# Patient Record
Sex: FEMALE | Race: ASIAN | Employment: UNEMPLOYED | URBAN - METROPOLITAN AREA
[De-identification: names, ages, dates, MRNs, and addresses within clinical notes are randomized per-mention and may not be internally consistent; named-entity substitution may affect disease eponyms.]

---

## 2024-09-10 ENCOUNTER — APPOINTMENT (OUTPATIENT)
Dept: CT IMAGING | Facility: HOSPITAL | Age: 69
End: 2024-09-10
Attending: EMERGENCY MEDICINE
Payer: COMMERCIAL

## 2024-09-10 ENCOUNTER — APPOINTMENT (OUTPATIENT)
Dept: GENERAL RADIOLOGY | Facility: HOSPITAL | Age: 69
End: 2024-09-10
Attending: EMERGENCY MEDICINE
Payer: COMMERCIAL

## 2024-09-10 ENCOUNTER — HOSPITAL ENCOUNTER (INPATIENT)
Facility: HOSPITAL | Age: 69
LOS: 4 days | Discharge: HOME | End: 2024-09-14
Attending: EMERGENCY MEDICINE | Admitting: HOSPITALIST
Payer: COMMERCIAL

## 2024-09-10 ENCOUNTER — APPOINTMENT (OUTPATIENT)
Dept: INTERVENTIONAL RADIOLOGY/VASCULAR | Facility: HOSPITAL | Age: 69
End: 2024-09-10
Attending: INTERNAL MEDICINE
Payer: COMMERCIAL

## 2024-09-10 DIAGNOSIS — I21.3 ST ELEVATION MYOCARDIAL INFARCTION (STEMI), UNSPECIFIED ARTERY (HCC): Primary | ICD-10-CM

## 2024-09-10 DIAGNOSIS — I44.1 HEART BLOCK AV SECOND DEGREE: ICD-10-CM

## 2024-09-10 PROBLEM — D64.9 ANEMIA: Status: ACTIVE | Noted: 2024-09-10

## 2024-09-10 LAB
ALBUMIN SERPL-MCNC: 3.6 G/DL (ref 3.2–4.8)
ALBUMIN/GLOB SERPL: 1.2 {RATIO} (ref 1–2)
ALP LIVER SERPL-CCNC: 51 U/L
ALT SERPL-CCNC: 20 U/L
ANION GAP SERPL CALC-SCNC: 8 MMOL/L (ref 0–18)
AST SERPL-CCNC: 30 U/L (ref ?–34)
BASE EXCESS BLDV CALC-SCNC: -2 MMOL/L
BASOPHILS # BLD AUTO: 0.03 X10(3) UL (ref 0–0.2)
BASOPHILS NFR BLD AUTO: 0.3 %
BILIRUB SERPL-MCNC: 0.6 MG/DL (ref 0.2–1.1)
BUN BLD-MCNC: 9 MG/DL (ref 9–23)
CALCIUM BLD-MCNC: 9 MG/DL (ref 8.7–10.4)
CHLORIDE SERPL-SCNC: 104 MMOL/L (ref 98–112)
CHOLEST SERPL-MCNC: 156 MG/DL (ref ?–200)
CO2 SERPL-SCNC: 25 MMOL/L (ref 21–32)
CREAT BLD-MCNC: 0.84 MG/DL
EGFRCR SERPLBLD CKD-EPI 2021: 75 ML/MIN/1.73M2 (ref 60–?)
EOSINOPHIL # BLD AUTO: 0.24 X10(3) UL (ref 0–0.7)
EOSINOPHIL NFR BLD AUTO: 2.6 %
ERYTHROCYTE [DISTWIDTH] IN BLOOD BY AUTOMATED COUNT: 13.2 %
GLOBULIN PLAS-MCNC: 2.9 G/DL (ref 2–3.5)
GLUCOSE BLD-MCNC: 116 MG/DL (ref 70–99)
GLUCOSE BLD-MCNC: 248 MG/DL (ref 70–99)
HCO3 BLDV-SCNC: 23 MEQ/L (ref 22–26)
HCT VFR BLD AUTO: 36.2 %
HDLC SERPL-MCNC: 49 MG/DL (ref 40–59)
HGB BLD-MCNC: 11.2 G/DL
IMM GRANULOCYTES # BLD AUTO: 0.02 X10(3) UL (ref 0–1)
IMM GRANULOCYTES NFR BLD: 0.2 %
LDLC SERPL CALC-MCNC: 85 MG/DL (ref ?–100)
LYMPHOCYTES # BLD AUTO: 4.4 X10(3) UL (ref 1–4)
LYMPHOCYTES NFR BLD AUTO: 47.6 %
MCH RBC QN AUTO: 28.3 PG (ref 26–34)
MCHC RBC AUTO-ENTMCNC: 30.9 G/DL (ref 31–37)
MCV RBC AUTO: 91.4 FL
MONOCYTES # BLD AUTO: 0.68 X10(3) UL (ref 0.1–1)
MONOCYTES NFR BLD AUTO: 7.4 %
NEUTROPHILS # BLD AUTO: 3.88 X10 (3) UL (ref 1.5–7.7)
NEUTROPHILS # BLD AUTO: 3.88 X10(3) UL (ref 1.5–7.7)
NEUTROPHILS NFR BLD AUTO: 41.9 %
NONHDLC SERPL-MCNC: 107 MG/DL (ref ?–130)
OSMOLALITY SERPL CALC.SUM OF ELEC: 291 MOSM/KG (ref 275–295)
OXYHGB MFR BLDV: 82.8 % (ref 72–78)
PCO2 BLDV: 43 MM HG (ref 38–50)
PH BLDV: 7.35 [PH] (ref 7.33–7.43)
PLATELET # BLD AUTO: 265 10(3)UL (ref 150–450)
PO2 BLDV: 52 MM HG (ref 30–50)
POTASSIUM SERPL-SCNC: 3.8 MMOL/L (ref 3.5–5.1)
PROT SERPL-MCNC: 6.5 G/DL (ref 5.7–8.2)
RBC # BLD AUTO: 3.96 X10(6)UL
SODIUM SERPL-SCNC: 137 MMOL/L (ref 136–145)
T4 FREE SERPL-MCNC: 1.2 NG/DL (ref 0.8–1.7)
TRIGL SERPL-MCNC: 124 MG/DL (ref 30–149)
TROPONIN I SERPL HS-MCNC: 135 NG/L
TSI SER-ACNC: 7.22 MIU/ML (ref 0.55–4.78)
VIT D+METAB SERPL-MCNC: 22.3 NG/ML (ref 30–100)
VLDLC SERPL CALC-MCNC: 20 MG/DL (ref 0–30)
WBC # BLD AUTO: 9.3 X10(3) UL (ref 4–11)

## 2024-09-10 PROCEDURE — 99223 1ST HOSP IP/OBS HIGH 75: CPT | Performed by: HOSPITALIST

## 2024-09-10 PROCEDURE — 027034Z DILATION OF CORONARY ARTERY, ONE ARTERY WITH DRUG-ELUTING INTRALUMINAL DEVICE, PERCUTANEOUS APPROACH: ICD-10-PCS | Performed by: INTERNAL MEDICINE

## 2024-09-10 PROCEDURE — 71045 X-RAY EXAM CHEST 1 VIEW: CPT | Performed by: EMERGENCY MEDICINE

## 2024-09-10 PROCEDURE — B240ZZ3 ULTRASONOGRAPHY OF SINGLE CORONARY ARTERY, INTRAVASCULAR: ICD-10-PCS | Performed by: INTERNAL MEDICINE

## 2024-09-10 PROCEDURE — B41FYZZ FLUOROSCOPY OF RIGHT LOWER EXTREMITY ARTERIES USING OTHER CONTRAST: ICD-10-PCS | Performed by: INTERNAL MEDICINE

## 2024-09-10 PROCEDURE — 71275 CT ANGIOGRAPHY CHEST: CPT | Performed by: EMERGENCY MEDICINE

## 2024-09-10 PROCEDURE — 70450 CT HEAD/BRAIN W/O DYE: CPT | Performed by: EMERGENCY MEDICINE

## 2024-09-10 PROCEDURE — 74177 CT ABD & PELVIS W/CONTRAST: CPT | Performed by: EMERGENCY MEDICINE

## 2024-09-10 PROCEDURE — B211YZZ FLUOROSCOPY OF MULTIPLE CORONARY ARTERIES USING OTHER CONTRAST: ICD-10-PCS | Performed by: INTERNAL MEDICINE

## 2024-09-10 PROCEDURE — 72125 CT NECK SPINE W/O DYE: CPT | Performed by: EMERGENCY MEDICINE

## 2024-09-10 PROCEDURE — 4A023N7 MEASUREMENT OF CARDIAC SAMPLING AND PRESSURE, LEFT HEART, PERCUTANEOUS APPROACH: ICD-10-PCS | Performed by: INTERNAL MEDICINE

## 2024-09-10 RX ORDER — IODIXANOL 320 MG/ML
100 INJECTION, SOLUTION INTRAVASCULAR
Status: DISCONTINUED | OUTPATIENT
Start: 2024-09-10 | End: 2024-09-10

## 2024-09-10 RX ORDER — IODIXANOL 320 MG/ML
100 INJECTION, SOLUTION INTRAVASCULAR
Status: COMPLETED | OUTPATIENT
Start: 2024-09-10 | End: 2024-09-10

## 2024-09-10 RX ORDER — ASPIRIN 81 MG/1
81 TABLET ORAL DAILY
Status: DISCONTINUED | OUTPATIENT
Start: 2024-09-11 | End: 2024-09-10

## 2024-09-10 RX ORDER — ACETAMINOPHEN 500 MG
500 TABLET ORAL EVERY 4 HOURS PRN
Status: DISCONTINUED | OUTPATIENT
Start: 2024-09-10 | End: 2024-09-14

## 2024-09-10 RX ORDER — ATENOLOL 50 MG/1
50 TABLET ORAL DAILY
COMMUNITY
End: 2024-09-14

## 2024-09-10 RX ORDER — HEPARIN SODIUM 5000 [USP'U]/ML
INJECTION, SOLUTION INTRAVENOUS; SUBCUTANEOUS
Status: COMPLETED
Start: 2024-09-10 | End: 2024-09-10

## 2024-09-10 RX ORDER — ASPIRIN 81 MG/1
324 TABLET, CHEWABLE ORAL ONCE
Status: COMPLETED | OUTPATIENT
Start: 2024-09-10 | End: 2024-09-10

## 2024-09-10 RX ORDER — ATORVASTATIN CALCIUM 80 MG/1
80 TABLET, FILM COATED ORAL NIGHTLY
Status: DISCONTINUED | OUTPATIENT
Start: 2024-09-10 | End: 2024-09-14

## 2024-09-10 RX ORDER — DOPAMINE HYDROCHLORIDE 320 MG/100ML
INJECTION, SOLUTION INTRAVENOUS
Status: COMPLETED
Start: 2024-09-10 | End: 2024-09-10

## 2024-09-10 RX ORDER — ASPIRIN 81 MG/1
81 TABLET, CHEWABLE ORAL DAILY
Status: DISCONTINUED | OUTPATIENT
Start: 2024-09-11 | End: 2024-09-14

## 2024-09-10 RX ORDER — POLYETHYLENE GLYCOL 3350 17 G/17G
17 POWDER, FOR SOLUTION ORAL DAILY PRN
Status: DISCONTINUED | OUTPATIENT
Start: 2024-09-10 | End: 2024-09-14

## 2024-09-10 RX ORDER — BISACODYL 10 MG
10 SUPPOSITORY, RECTAL RECTAL
Status: DISCONTINUED | OUTPATIENT
Start: 2024-09-10 | End: 2024-09-14

## 2024-09-10 RX ORDER — DEXTROSE MONOHYDRATE 25 G/50ML
50 INJECTION, SOLUTION INTRAVENOUS
Status: DISCONTINUED | OUTPATIENT
Start: 2024-09-10 | End: 2024-09-14

## 2024-09-10 RX ORDER — NOREPINEPHRINE BITARTRATE 1 MG/ML
INJECTION, SOLUTION INTRAVENOUS
Status: COMPLETED
Start: 2024-09-10 | End: 2024-09-10

## 2024-09-10 RX ORDER — AMLODIPINE BESYLATE 5 MG/1
5 TABLET ORAL DAILY
COMMUNITY
End: 2024-09-14

## 2024-09-10 RX ORDER — SODIUM CHLORIDE 9 MG/ML
INJECTION, SOLUTION INTRAVENOUS CONTINUOUS
Status: ACTIVE | OUTPATIENT
Start: 2024-09-10 | End: 2024-09-10

## 2024-09-10 RX ORDER — LIDOCAINE HYDROCHLORIDE 10 MG/ML
INJECTION, SOLUTION EPIDURAL; INFILTRATION; INTRACAUDAL; PERINEURAL
Status: COMPLETED
Start: 2024-09-10 | End: 2024-09-10

## 2024-09-10 RX ORDER — NICOTINE POLACRILEX 4 MG
15 LOZENGE BUCCAL
Status: DISCONTINUED | OUTPATIENT
Start: 2024-09-10 | End: 2024-09-14

## 2024-09-10 RX ORDER — HEPARIN SODIUM 5000 [USP'U]/ML
5000 INJECTION, SOLUTION INTRAVENOUS; SUBCUTANEOUS EVERY 12 HOURS SCHEDULED
Status: DISCONTINUED | OUTPATIENT
Start: 2024-09-10 | End: 2024-09-14

## 2024-09-10 RX ORDER — NICOTINE POLACRILEX 4 MG
30 LOZENGE BUCCAL
Status: DISCONTINUED | OUTPATIENT
Start: 2024-09-10 | End: 2024-09-14

## 2024-09-10 RX ORDER — MELATONIN
3 NIGHTLY PRN
Status: DISCONTINUED | OUTPATIENT
Start: 2024-09-10 | End: 2024-09-14

## 2024-09-10 RX ORDER — METOCLOPRAMIDE HYDROCHLORIDE 5 MG/ML
10 INJECTION INTRAMUSCULAR; INTRAVENOUS EVERY 8 HOURS PRN
Status: DISCONTINUED | OUTPATIENT
Start: 2024-09-10 | End: 2024-09-14

## 2024-09-10 RX ORDER — ONDANSETRON 2 MG/ML
4 INJECTION INTRAMUSCULAR; INTRAVENOUS EVERY 6 HOURS PRN
Status: DISCONTINUED | OUTPATIENT
Start: 2024-09-10 | End: 2024-09-14

## 2024-09-10 RX ORDER — SENNOSIDES 8.6 MG
17.2 TABLET ORAL NIGHTLY PRN
Status: DISCONTINUED | OUTPATIENT
Start: 2024-09-10 | End: 2024-09-14

## 2024-09-10 RX ORDER — MIDAZOLAM HYDROCHLORIDE 1 MG/ML
INJECTION INTRAMUSCULAR; INTRAVENOUS
Status: COMPLETED
Start: 2024-09-10 | End: 2024-09-10

## 2024-09-10 RX ORDER — SODIUM PHOSPHATE, DIBASIC AND SODIUM PHOSPHATE, MONOBASIC 7; 19 G/230ML; G/230ML
1 ENEMA RECTAL ONCE AS NEEDED
Status: DISCONTINUED | OUTPATIENT
Start: 2024-09-10 | End: 2024-09-14

## 2024-09-10 NOTE — CONSULTS
[facility] - CARDIOLOGY CONSULT NOTE    Samantha Beasley Patient Status:  Emergency    1/3/1955 MRN [id number]   Location [facility] EMERGENCY DEPARTMENT Attending Ayan Griggs MD   Hosp Day # 0 PCP No primary care provider on file.     Date of Admission:  9/10/2024  Date of Consult:  9/10/2024  I was asked by Ayan Griggs MD to provide recommendations for evaluation and management of cardiac issues.    Reason for Consultation:     STEMI    History of Present Illness:   Patient is a 69 year old female with HTN who was found down with seizure like activity. Unclear if she hit her head. EMS was called. Concern for STEMI on EKG. She is A and O x1 which is not her normal self. When asked if she has chest pain or SOB she denies. Her EKG shows JENNIFER in the inferior leads with bradycardia, repeat shows improved ST changes. She was sent for CT head/neck/chest to r/o intracranial hemorrhage and dissection. Troponin is pending. ASA loaded.       Results:     Lab Results   Component Value Date    WBC 9.3 09/10/2024    HGB 11.2 (L) 09/10/2024    HCT 36.2 09/10/2024    .0 09/10/2024       No results found.  EKG 12 Lead    Result Date: 9/10/2024  Sinus rhythm with 1st degree A-V block Nonspecific ST abnormality Abnormal ECG When compared with ECG of 10-SEP-2024 16:16, Sinus rhythm has replaced Junctional rhythm ST less elevated in Inferior leads ST no longer depressed in Lateral leads     Past Medical History  No past medical history on file.    Past Surgical History  No past surgical history on file.    Family History  No family history on file.    Social History  Pediatric History   Patient Parents    Not on file     Other Topics Concern    Not on file   Social History Narrative    Not on file           Current Medications:  No current facility-administered medications for this encounter.     (Not in a hospital admission)      Allergies  No Known Allergies    Review of Systems:   10 pt ROS performed,  separate from HPI  Review of Systems:  GENERAL: no fevers, chills, sweats  HEENT: no visual or hearing changes  SKIN: denies any unusual skin lesions or rashes  RESPIRATORY: denies shortness of breath with exertion  CARDIOVASCULAR: no active chest pain, no claudication  GI: denies abdominal pain and denies heartburn  : no dysuria or hematuria  NEURO: denies headaches, focal weaknesses or paresthesias  All other systems were reviewed are negative  Physical Exam:   Blood pressure 110/85, pulse (!) 44, resp. rate 16, weight 160 lb (72.6 kg), SpO2 100%.    Scheduled Meds:       Physical Exam:    General: No acute distress: A and O x1   HEENT: Normocephalic, anicteric sclera, neck supple  Neck: No JVD, carotids 2+, supple  Cardiac: Regular rate. No pathologic murmur.  Lungs: Clear with normal effort.  Normal excursions and effort.  Abdomen: Soft, non-tender. BS-present.  Extremities: Without clubbing, cyanosis.  Peripheral pulsespresent.  Neurologic: Alert and oriented, normal affect. Motor ok.  Skin: Warm and dry.     Imaging: I independently visualized all relevant chest imaging in PACS, agree with radiology interpretation except where noted.  Thank you for allowing me to participate in the care of your patient.    Labs:  HEM:  Recent Labs   Lab 09/10/24  1619   WBC 9.3   HGB 11.2*   .0       Chem:  No results for input(s): \"NA\", \"K\", \"CL\", \"CO2\", \"BUN\", \"CREATSERUM\", \"CA\", \"CAION\", \"MG\", \"PHOS\", \"GLU\" in the last 168 hours.    No results for input(s): \"ALT\", \"AST\", \"ALB\", \"AMYLASE\", \"LIPASE\", \"LDH\" in the last 168 hours.    Invalid input(s): \"ALPHOS\", \"TBIL\", \"DBIL\", \"TPROT\"    No results for input(s): \"TROP\", \"CK\" in the last 168 hours.    No results for input(s): \"PTP\", \"INR\" in the last 168 hours.    Impression:   # Concern for STEMI, repeat EKG with resolution of STEMI criteria. Reassuring that patient is chest pain free. Needs AMS eval prior to taking to cath lab  # HTN    Recommendations:  Check CT head  give acute mental status changes, possible head trama   Check CT chest r/o dissection   Await Trop, repeat EKG  If above is normal, will take to cath lab to r/o inferior ischemia     C5    Marcial Mercado MD  [facility]  9/10/2024

## 2024-09-10 NOTE — PROCEDURES
[facility]   part of [facility]    Cardiac Catheterization Note    Primary Proceduralist: Marcial Mercado MD  Procedure Performed: LHC, COR, and IVUS guided PCI of RCA  Date of Procedure: 9/10/2024   Indication: STEMI  Pre Operative Diagnosis: STEMI  Post Operative Diagnosis: Same  Estimated blood loss: <10cc  Specimens: None    Consent:   Informed written consent was obtained from the patient after risk benefits and alternative explained the patient.  Patient agreed to proceed    Sedation  IV conscious sedation was achieved with Versed and fentanyl.  It was administered under my direct supervision.  Hemodynamic monitoring took place throughout the entire procedure by myself in the Cath Lab staff.  1mg of Versed and 25mcg of Fentanyl were given.  Start Time: 17:17 End Time: 17:54      Description of the procedure: After written informed consent was obtained from the patient, patient was brought to the cardiac catheterization laboratory in a stable condition and fasting state.  Patient was prepped and draped in the usual sterile fashion.  IV conscious sedation was achieved with Versed and fentanyl.  Lidocaine 1% was used to infiltrate the right femoral artery site for local anesthesia and a 6 Ethiopian introducer sheath was inserted into the RFA under US guidance.  JL 4 catheter was advanced over a wire, selective coronary angiogram was performed of the left system.  JR4 guide was advanced over a wire, selective coronary angiogram was performed of the RCA.  There was a 100% thrombotic occlusion of the proximal RCA, IVUS guided PCI was performed (see results below), with good angiographic result.  At the beginning of the procedure patient became hypotensive, and bradycardiac.  She received adenosine, was started on a dopamine drip, as well as norepinephrine drip to stabilize her hemodynamics.  After the right coronary artery was opened, her hemodynamics stabilized and she was weaned off the  drips.  Pigtail was used to perform LVEDP measurement.      Coronary Angiogram Findings:  LM: Large caliber artery giving rise to LAD & LCX. No significant disease  LAD: Large caliber artery giving rise to diagonal and septal branches. Severe eccentric 80% proximal lesion, 50% mid vessel lesion, 90% distal apical lesion.  D1: small vessel mild diffuse disease   Ramus: large vessel, tortuous, luminal irregularities   LCX: Medium to large caliber artery giving rise to OM branches.   OM1: 50% proximal stenosis, moderate sized vessel  RCA: 100% proximal occlusion    Hemodynamics:  LVEDP 19 mmHg  No significant gradient upon Ao-LV pullback    Intervention:  Proximal RCA has thrombotic 100% occlusion.   Guide: JR4  Wire: Runthrough  Pre-dilation: 2.5 x 15 mm  IVUS was used to size the vessel, it was a 3.0 mm distal vessel.   Stent: 3.0 x 28 mm Synergy stent   Post dilation: 3.0 x 15 mm distally, and 3.5 x 15 proximally   IVUS was done post stent deployment- good stent apposition and edge dissection   100>0, JYOTI 0 to JYOTI 3 flow    Monitored sedation administered by the cath lab RN, and supervised throughout the procedure by myself. Total time 47 minutes.     Closure: Femoral angiogram performed. Perclose was deployed    No immediate complications.    A/P:  Inferior STEMI s/p GREGG to the proximal RCA  Severe proximal LAD lesion, will bring back for PCI in a few days prior pending clinical course   ASA and Brillinta   High intensity statin  Monitor in CCU    Marcial Mercado MD  Interventional Cardiology  [facility] – part of the [facility]

## 2024-09-10 NOTE — ED QUICK NOTES
Cath lab RN's at bedside as well as Cards MD.  Verbal read of pt's CT scans relayed to Dr. Griggs.  PT transferred into the care of cath lab and was transported up.

## 2024-09-10 NOTE — H&P
[facility]     Samantha Beasley Patient Status:  Emergency    1/3/1955 MRN [id number]   Location [facility] INTERVENTIONAL SUITES Attending No att. providers found   Hosp Day # 0 PCP No primary care provider on file.     Chief Complaint: \"Seizure-like activity\"    Subjective:    History of Present Illness:     Samantha Beasley is a 69 year old female with PMHx Htn presents via EMS for concerns of seizure-like activity.  On arrival, pt had concerns for stemi on EKG. Pt reportedly lucid but is not her self right now. In ER, pt was evaluaed by cards who initially wanted to rule out dissection / ct brain to r/o intracranial process; crisostomo negative. Pt taken to cath lab emergently. Pt seen s/p C w/ GREGG. At the bedside in the CNICU I spoke to patient's grandson. 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 There was no seizure like activity reported at this time. Pt is currently lucid and at her baseline mentation per grandson at the bedside.         History/Other:    Past Medical History:  No past medical history on file.  Past Surgical History:   No past surgical history on file.   Family History:   No family history on file.  Social History:         Allergies: No Known Allergies    Medications:    No current facility-administered medications on file prior to encounter.     No current outpatient medications on file prior to encounter.       Review of Systems:   A comprehensive review of systems was completed.    Pertinent positives and negatives noted in the HPI.    Objective:   Physical Exam:    BP (!) 134/92   Pulse 63   Resp 20   Wt 160 lb (72.6 kg)   SpO2 100%   General: No acute distress, Alert  Respiratory: No rhonchi, no wheezes  Cardiovascular: S1, S2. Regular rate and rhythm  Abdomen: Soft, Non-tender, non-distended, positive bowel sounds  Neuro: No new focal deficits  Extremities: No  edema      Results:    Labs:      Labs Last 24 Hours:    Recent Labs   Lab 09/10/24  1619   RBC 3.96   HGB 11.2*   HCT 36.2   MCV 91.4   MCH 28.3   MCHC 30.9*   RDW 13.2   NEPRELIM 3.88   WBC 9.3   .0       Recent Labs   Lab 09/10/24  1619   *   BUN 9   CREATSERUM 0.84   EGFRCR 75   CA 9.0   ALB 3.6      K 3.8      CO2 25.0   ALKPHO 51*   AST 30   ALT 20   BILT 0.6   TP 6.5       No results found for: \"PT\", \"INR\"    Recent Labs   Lab 09/10/24  1619   TROPHS 135*       No results for input(s): \"TROP\", \"PBNP\" in the last 168 hours.    No results for input(s): \"PCT\" in the last 168 hours.    Imaging: Imaging data reviewed in Epic.    Assessment & Plan:      #STEMI  #Bradycardia  -Cardiology consulted from the emergency room  -CTA chest/abdomen/pelvis ordered to rule out dissection, negative study  -s/p LHC on 9/10 (GREGG to proximal RCA), severe proximal LAD to be addressed in future  -A1c, lipid panel  -DAPT, bb, statin per cards  -echo    #Essential HTN  -unsure what meds pt on but she says \"I have bp issues\"    #Syncope  -2/2 above  -CT cervical spine, CT brain (9/10): Negative for acute process  -metabolic crisostomo    #Hyperglycemia  -no previous records, check A1c  -ISS for now    #Normocytic anemia     #Incidental aneurysmal dilatation of ascending thoracic aorta to diameter 4.4 cm  -cards following        Plan of care discussed with ER physician, patient, patient's grandson    Kirillfantasma DO Omar    Supplementary Documentation:     The 21st Century Cures Act makes medical notes like these available to patients in the interest of transparency. Please be advised this is a medical document. Medical documents are intended to carry relevant information, facts as evident, and the clinical opinion of the practitioner. The medical note is intended as peer to peer communication and may appear blunt or direct. It is written in medical language and may contain abbreviations or verbiage that are unfamiliar.

## 2024-09-11 ENCOUNTER — APPOINTMENT (OUTPATIENT)
Dept: CV DIAGNOSTICS | Facility: HOSPITAL | Age: 69
End: 2024-09-11
Attending: INTERNAL MEDICINE
Payer: COMMERCIAL

## 2024-09-11 LAB
ALBUMIN SERPL-MCNC: 3.7 G/DL (ref 3.2–4.8)
ALBUMIN/GLOB SERPL: 1.1 {RATIO} (ref 1–2)
ALP LIVER SERPL-CCNC: 57 U/L
ALT SERPL-CCNC: 19 U/L
AMMONIA PLAS-MCNC: 24 UMOL/L (ref 11–32)
ANION GAP SERPL CALC-SCNC: 8 MMOL/L (ref 0–18)
AST SERPL-CCNC: 33 U/L (ref ?–34)
ATRIAL RATE: 62 BPM
ATRIAL RATE: 67 BPM
ATRIAL RATE: 75 BPM
BASOPHILS # BLD AUTO: 0.01 X10(3) UL (ref 0–0.2)
BASOPHILS NFR BLD AUTO: 0.1 %
BILIRUB SERPL-MCNC: 1.4 MG/DL (ref 0.2–1.1)
BUN BLD-MCNC: 7 MG/DL (ref 9–23)
CALCIUM BLD-MCNC: 9.6 MG/DL (ref 8.7–10.4)
CHLORIDE SERPL-SCNC: 106 MMOL/L (ref 98–112)
CO2 SERPL-SCNC: 25 MMOL/L (ref 21–32)
CREAT BLD-MCNC: 0.65 MG/DL
EGFRCR SERPLBLD CKD-EPI 2021: 95 ML/MIN/1.73M2 (ref 60–?)
EOSINOPHIL # BLD AUTO: 0.03 X10(3) UL (ref 0–0.7)
EOSINOPHIL NFR BLD AUTO: 0.3 %
ERYTHROCYTE [DISTWIDTH] IN BLOOD BY AUTOMATED COUNT: 13.2 %
EST. AVERAGE GLUCOSE BLD GHB EST-MCNC: 131 MG/DL (ref 68–126)
GLOBULIN PLAS-MCNC: 3.3 G/DL (ref 2–3.5)
GLUCOSE BLD-MCNC: 106 MG/DL (ref 70–99)
GLUCOSE BLD-MCNC: 98 MG/DL (ref 70–99)
HBA1C MFR BLD: 6.2 % (ref ?–5.7)
HCT VFR BLD AUTO: 36.1 %
HGB BLD-MCNC: 11.8 G/DL
IMM GRANULOCYTES # BLD AUTO: 0.03 X10(3) UL (ref 0–1)
IMM GRANULOCYTES NFR BLD: 0.3 %
LYMPHOCYTES # BLD AUTO: 1.19 X10(3) UL (ref 1–4)
LYMPHOCYTES NFR BLD AUTO: 13.4 %
MAGNESIUM SERPL-MCNC: 2.1 MG/DL (ref 1.6–2.6)
MCH RBC QN AUTO: 28.9 PG (ref 26–34)
MCHC RBC AUTO-ENTMCNC: 32.7 G/DL (ref 31–37)
MCV RBC AUTO: 88.3 FL
MONOCYTES # BLD AUTO: 0.71 X10(3) UL (ref 0.1–1)
MONOCYTES NFR BLD AUTO: 8 %
MRSA DNA SPEC QL NAA+PROBE: NEGATIVE
NEUTROPHILS # BLD AUTO: 6.88 X10 (3) UL (ref 1.5–7.7)
NEUTROPHILS # BLD AUTO: 6.88 X10(3) UL (ref 1.5–7.7)
NEUTROPHILS NFR BLD AUTO: 77.9 %
OSMOLALITY SERPL CALC.SUM OF ELEC: 286 MOSM/KG (ref 275–295)
P AXIS: -1 DEGREES
P AXIS: 28 DEGREES
P AXIS: 43 DEGREES
P-R INTERVAL: 182 MS
P-R INTERVAL: 266 MS
P-R INTERVAL: 280 MS
PLATELET # BLD AUTO: 255 10(3)UL (ref 150–450)
POTASSIUM SERPL-SCNC: 3.7 MMOL/L (ref 3.5–5.1)
PROT SERPL-MCNC: 7 G/DL (ref 5.7–8.2)
Q-T INTERVAL: 402 MS
Q-T INTERVAL: 406 MS
Q-T INTERVAL: 450 MS
Q-T INTERVAL: 492 MS
QRS DURATION: 84 MS
QRS DURATION: 86 MS
QRS DURATION: 86 MS
QRS DURATION: 92 MS
QTC CALCULATION (BEZET): 429 MS
QTC CALCULATION (BEZET): 448 MS
QTC CALCULATION (BEZET): 456 MS
QTC CALCULATION (BEZET): 474 MS
R AXIS: -13 DEGREES
R AXIS: 21 DEGREES
R AXIS: 23 DEGREES
R AXIS: 7 DEGREES
RBC # BLD AUTO: 4.09 X10(6)UL
SODIUM SERPL-SCNC: 139 MMOL/L (ref 136–145)
T AXIS: -31 DEGREES
T AXIS: 66 DEGREES
T AXIS: 87 DEGREES
T AXIS: 91 DEGREES
VENTRICULAR RATE: 56 BPM
VENTRICULAR RATE: 62 BPM
VENTRICULAR RATE: 67 BPM
VENTRICULAR RATE: 75 BPM
VIT B12 SERPL-MCNC: 221 PG/ML (ref 211–911)
WBC # BLD AUTO: 8.9 X10(3) UL (ref 4–11)

## 2024-09-11 PROCEDURE — 93306 TTE W/DOPPLER COMPLETE: CPT | Performed by: INTERNAL MEDICINE

## 2024-09-11 PROCEDURE — 99232 SBSQ HOSP IP/OBS MODERATE 35: CPT | Performed by: STUDENT IN AN ORGANIZED HEALTH CARE EDUCATION/TRAINING PROGRAM

## 2024-09-11 RX ORDER — ASPIRIN 81 MG/1
TABLET, CHEWABLE ORAL ONCE
Status: CANCELLED | OUTPATIENT
Start: 2024-09-11 | End: 2024-09-11

## 2024-09-11 RX ORDER — POTASSIUM CHLORIDE 1.5 G/1.58G
40 POWDER, FOR SOLUTION ORAL ONCE
Status: COMPLETED | OUTPATIENT
Start: 2024-09-11 | End: 2024-09-11

## 2024-09-11 NOTE — ED PROVIDER NOTES
Patient Seen in: [facility]      History     Chief Complaint   Patient presents with    Chest Pain     Seizure like activity followed by confusion and found to be a STEMI     Stated Complaint: Seizure like activity followed by confusion and found to be a STEMI    Subjective:   HPI    69-year-old female here for possible STEMI.  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  Upon arrival with the paramedics the patient was somewhat confused twelve-lead EKG showed a STEMI no the patient is a poor historian and does not speak English.  A tech in the ER speaks her language as well as a family member of the her employer showed up and both him were try to obtain some further history is no history of seizures or history of cardiac problems and the patient was somewhat confused as to where she is at and poor historian.    Objective:   Past Medical History:    High blood pressure              Past Surgical History:   Procedure Laterality Date    Cataract      Fracture surgery      Hysterectomy                  Social History     Socioeconomic History    Marital status: Unknown   Tobacco Use    Smoking status: Never    Smokeless tobacco: Never   Vaping Use    Vaping status: Never Used   Substance and Sexual Activity    Alcohol use: Never    Drug use: Never     Social Determinants of Health     Food Insecurity: No Food Insecurity (9/10/2024)    Food Insecurity     Food Insecurity: Never true   Transportation Needs: No Transportation Needs (9/10/2024)    Transportation Needs     Lack of Transportation: No   Housing Stability: Low Risk  (9/10/2024)    Housing Stability     Housing Instability: No              Review of Systems    Positive for stated Chief Complaint: Chest Pain (Seizure like activity followed by confusion and found to be a STEMI)    Other systems are as noted in HPI.  Constitutional and vital signs reviewed.      All other  systems reviewed and negative except as noted above.    Physical Exam     ED Triage Vitals   BP 09/10/24 1619 110/85   Pulse 09/10/24 1619 (!) 44   Resp 09/10/24 1619 16   Temp 09/10/24 1825 (!) 96.3 °F (35.7 °C)   Temp src 09/10/24 1619 Oral   SpO2 09/10/24 1619 100 %   O2 Device 09/10/24 1619 None (Room air)       Current Vitals:   Vital Signs  BP: 138/88  Pulse: 65  Resp: 19  Temp: 97.7 °F (36.5 °C)  Temp src: Temporal  MAP (mmHg): (!) 103    Oxygen Therapy  SpO2: 96 %  O2 Device: None (Room air)  O2 Flow Rate (L/min): 3 L/min  Pulse Oximetry Type: Continuous  Oximetry Probe Site Changed: No  Pulse Ox Probe Location: Left hand            Physical Exam    Patient is alert she is orient x 2 in no acute distress the heart rate was in the 40s blood pressure with the medics was 90 in the emergency department it was like 115 systolic.  Head is atraumatic the pupils are equal round react to light extract muscles are intact the neck there is no JVD or lymphadenopathy lungs are clear cardiovascular exam shows regular rate and rhythm without murmurs abdomen is soft and nontender skin is no rash neurologic exam there is mild confusion but no focal deficits.    ED Course     Labs Reviewed   COMP METABOLIC PANEL (14) - Abnormal; Notable for the following components:       Result Value    Glucose 248 (*)     Alkaline Phosphatase 51 (*)     All other components within normal limits   CBC WITH DIFFERENTIAL WITH PLATELET - Abnormal; Notable for the following components:    HGB 11.2 (*)     MCHC 30.9 (*)     Lymphocyte Absolute 4.40 (*)     All other components within normal limits   TROPONIN I HIGH SENSITIVITY - Abnormal; Notable for the following components:    Troponin I (High Sensitivity) 135 (*)     All other components within normal limits   VENOUS BLOOD GAS - Abnormal; Notable for the following components:    Venous pO2 52 (*)     Venous O2Hb 82.8 (*)     All other components within normal limits   POCT GLUCOSE - Abnormal;  Notable for the following components:    POC Glucose 116 (*)     All other components within normal limits   LIPID PANEL - Normal   RAINBOW DRAW BLUE   RAINBOW DRAW GOLD   ED/MRSA SCREEN BY PCR-CC     EKG    Rate, intervals and axes as noted on EKG Report.  Rate: 67  Rhythm: Sinus Rhythm  Reading: Acute MI ST elevation in the inferior and posterior leads.  This is an abnormal EKG         NoneEKG    Rate, intervals and axes as noted on EKG Report.  Rate: 75  Rhythm: Sinus Rhythm  Reading: Nonspecific ST abnormality this is an abnormal EKG    EKG #3 ventricular rate 56 the rate axis interval reviewed there is sinus bradycardia with A-V dissociation and ST elevations in the inferior leads acute STEMI this is an abnormal EKG     Abnormal Labs Reviewed   COMP METABOLIC PANEL (14) - Abnormal; Notable for the following components:       Result Value    Glucose 248 (*)     Alkaline Phosphatase 51 (*)     All other components within normal limits   CBC WITH DIFFERENTIAL WITH PLATELET - Abnormal; Notable for the following components:    HGB 11.2 (*)     MCHC 30.9 (*)     Lymphocyte Absolute 4.40 (*)     All other components within normal limits   TROPONIN I HIGH SENSITIVITY - Abnormal; Notable for the following components:    Troponin I (High Sensitivity) 135 (*)     All other components within normal limits   VENOUS BLOOD GAS - Abnormal; Notable for the following components:    Venous pO2 52 (*)     Venous O2Hb 82.8 (*)     All other components within normal limits   POCT GLUCOSE - Abnormal; Notable for the following components:    POC Glucose 116 (*)     All other components within normal limits     Troponin 135     XR CHEST AP PORTABLE  (CPT=71045)    Result Date: 9/10/2024  CONCLUSION:  No acute radiographic findings.   LOCATION:  [facility]      Dictated by (CST): Ramos Sandoval MD on 9/10/2024 at 9:27 PM     Finalized by (CST): Ramos Sandoval MD on 9/10/2024 at 9:28 PM       CT SPINE CERVICAL (CPT=72125)    Result Date:  9/10/2024  CONCLUSION:  Negative for fracture or subluxation.    LOCATION:  [facility]   Dictated by (CST): Martin Quiroz MD on 9/10/2024 at 5:37 PM     Finalized by (CST): Martin Quiroz MD on 9/10/2024 at 5:40 PM       CTA CHEST + CT ABD (W) + CT PEL (W) (CPT=71275/97149)    Result Date: 9/10/2024  CONCLUSION:  1. Negative for pulmonary embolism. 2. No acute thoracic abnormality. 3. Aneurysmal dilatation of ascending thoracic aorta to a diameter 4.4 cm. 4. No acute abdominal-pelvic abnormality. 5. Details as above.  Continued clinical correlation recommended.  Findings immediately discussed with Dr. Griggs from the ED by telephone.    LOCATION:  [facility]   Dictated by (CST): Martin Quiroz MD on 9/10/2024 at 5:10 PM     Finalized by (CST): Martin Quiroz MD on 9/10/2024 at 5:16 PM       CT BRAIN OR HEAD (CPT=70450)    Result Date: 9/10/2024  CONCLUSION:  No acute intracranial abnormality.  Findings immediately discussed with Dr. Griggs from the ED by telephone.    LOCATION:  [facility]   Dictated by (CST): Martin Quiroz MD on 9/10/2024 at 5:05 PM     Finalized by (CST): Martin Quiroz MD on 9/10/2024 at 5:08 PM      Images independent reviewed there is no pulmonary embolus there is aneurysmal dilatation to 4.4 cm with no dissection.  CT of the brain was unremarkable         MDM      Initial differential diagnosis considered but not limited to includes intracranial hemorrhage seizure stroke STEMI pulmonary embolus dissection      Patient has STEMI was taken to cardiac Cath Lab she had what sounds like seizure activity this could have been related to cardiac arrhythmia unclear etiology there is no abnormality or evidence of trauma on the CT scan.  In the emergency department patient heart rate was bradycardic on the blood pressure improved she was given aspirin.  Cardiology was consulted saw the patient in the emergency department the patient's EKG initially improved briefly with decreased ST elevation and the patient was taken to CT and  after she returned the EKG again showed ST elevation.          A total of 45 minutes of critical care time (exclusive of billable procedures) was administered to manage the patient's cardiovascular instability due to her STEMI.  This involved direct patient intervention, complex decision making, and/or extensive discussions with the patient, family, and clinical staff.                           Medical Decision Making      Disposition and Plan     Clinical Impression:  1. ST elevation myocardial infarction (STEMI), unspecified artery (HCC)         Disposition:  There is no disposition on file for this visit.  There is no disposition time on file for this visit.    Follow-up:  No follow-up provider specified.        Medications Prescribed:  Current Discharge Medication List

## 2024-09-11 NOTE — OCCUPATIONAL THERAPY NOTE
OCCUPATIONAL THERAPY EVALUATION - INPATIENT    Room Number: 6602/6602-A  Evaluation Date: 2024     Type of Evaluation: Initial  Presenting Problem: Confusion, STEMI, CAD, bradycardia, ascending aortic aneurysm,s/p LHC w/ GREGG    Physician Order: IP Consult to Occupational Therapy  Reason for Therapy:  ADL/IADL Dysfunction and Discharge Planning      OCCUPATIONAL THERAPY ASSESSMENT   Patient is a 69 year old female admitted on 9/10/2024 with Presenting Problem: Confusion, STEMI, CAD, bradycardia, ascending aortic aneurysm,s/p LHC w/ GREGG. Co-Morbidities : HTN  Patient is currently functioning at baseline with  all ADL .  Prior to admission, patient's baseline is independent.  Patient reports no further questions/concerns at this time.     WEIGHT BEARING RESTRICTION  Weight Bearing Restriction: None       EVALUATION SESSION:  Patient at start of session: supine  FUNCTIONAL TRANSFER ASSESSMENT  Sit to Stand: Edge of Bed  Edge of Bed: Independent    COGNITION  Overall Cognitive Status:  WFL - within functional limits  COGNITION ASSESSMENTS       Upper Extremity:   ROM: within functional limits   Strength: is within functional limits     EDUCATION PROVIDED  Patient: Plan of Care; Role of Occupational Therapy  Patient's Response to Education: Verbalized Understanding    Equipment used: none  Therapist comments: independent supine to sit, ambulation, toilet transfer, hygiene, sink side hand and facial hygiene care, and bed mobility. Used audio .     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 : None  -   Putting on and taking off regular upper body clothing?: None  -   Taking care of personal grooming such as brushing teeth?: None  -   Eating meals?: None    AM-PAC Score:  Score: 23  Approx Degree of Impairment: 15.86%  Standardized Score (AM-PAC Scale): 51.12      ADDITIONAL TESTS     NEUROLOGICAL FINDINGS        PLAN   Patient has been evaluated and presents with no skilled Occupational Therapy needs at this time.  Patient discharged from Occupational Therapy services.  Please re-order if a new functional limitation presents during this admission.      Patient Evaluation Complexity Level:   Occupational Profile/Medical History LOW - Brief history including review of medical or therapy records    Specific performance deficits impacting engagement in ADL/IADL LOW  1 - 3 performance deficits    Client Assessment/Performance Deficits LOW - No comorbidities nor modifications of tasks    Clinical Decision Making LOW - Analysis of occupational profile, problem-focused assessments, limited treatment options    Overall Complexity LOW     OT Session Time: 18 minutes  Self-Care Home Management: 4 minutes

## 2024-09-11 NOTE — PROGRESS NOTES
[facility]   part of [facility]     Hospitalist Progress Note     Samantha Beasley Patient Status:  Inpatient    1/3/1955 MRN [id number]   Location [facility] 6NE-A Attending Melodie Low MD   Hosp Day # 1 PCP No primary care provider on file.     Chief Complaint: STEMI     Subjective:     Patient  denies CP, SOB.    Objective:    Review of Systems:   A comprehensive review of systems was completed; pertinent positive and negatives stated in subjective.    Vital signs:  Temp:  [96.3 °F (35.7 °C)-99.3 °F (37.4 °C)] 98.7 °F (37.1 °C)  Pulse:  [40-80] 59  Resp:  [13-26] 21  BP: (101-145)/() 123/64  SpO2:  [89 %-100 %] 98 %    Physical Exam:    General: No acute distress  Respiratory: No wheezes, no rhonchi  Cardiovascular: S1, S2, regular rate and rhythm  Abdomen: Soft, Non-tender, non-distended, positive bowel sounds  Neuro: No new focal deficits.   Extremities: No edema    Diagnostic Data:    Labs:  Recent Labs   Lab 09/10/24  1619 24  0453   WBC 9.3 8.9   HGB 11.2* 11.8*   MCV 91.4 88.3   .0 255.0       Recent Labs   Lab 09/10/24  1619 24  0453   * 98   BUN 9 7*   CREATSERUM 0.84 0.65   CA 9.0 9.6   ALB 3.6 3.7    139   K 3.8 3.7    106   CO2 25.0 25.0   ALKPHO 51* 57   AST 30 33   ALT 20 19   BILT 0.6 1.4*   TP 6.5 7.0       CrCl cannot be calculated (Unknown ideal weight.).    Recent Labs   Lab 09/10/24  1619   TROPHS 135*       No results for input(s): \"PTP\", \"INR\" in the last 168 hours.    Lab Results   Component Value Date    TSH 7.222 09/10/2024    T4F 1.2 09/10/2024             Microbiology    No results found for this visit on 09/10/24.      Imaging: Reviewed in Epic.    Medications:    heparin  5,000 Units Subcutaneous 2 times per day    aspirin  81 mg Oral Daily    ticagrelor  90 mg Oral Q12H    atorvastatin  80 mg Oral Nightly       Assessment & Plan:        #STEMI  #Bradycardia  -Cardiology consulted from the emergency room  -CTA  chest/abdomen/pelvis ordered to rule out dissection, negative study  -s/p LHC on 9/10 (GREGG to proximal RCA), severe proximal LAD - plan for LHC on 9/12/24  -A1c, lipid panel  -DAPT, bb, statin per cards  -echo     #Essential HTN  -unsure what meds pt on but she says \"I have bp issues\"     #Syncope  -2/2 above  -CT cervical spine, CT brain (9/10): Negative for acute process  -metabolic crisostomo    #Hyperglycemia  -no previous records, check A1c  -ISS for now    #Normocytic anemia      #Incidental aneurysmal dilatation of ascending thoracic aorta to diameter 4.4 cm  -cards following    Plan of care discussed w pt using [facility]      Melodie Low MD    Supplementary Documentation:     Quality:  DVT Mechanical Prophylaxis:   SCDs,    DVT Pharmacologic Prophylaxis   Medication    heparin (Porcine) 5000 UNIT/ML injection 5,000 Units      DVT Pharmacologic prophylaxis: Aspirin 162 mg         Code Status: Not on file  Syed: No urinary catheter in place  Syed Duration (in days):   Central line:    JOSE:     Discharge is dependent on: clinical     At this point Ms. Beasley is expected to be discharge to: home ?    The 21st Century Cures Act makes medical notes like these available to patients in the interest of transparency. Please be advised this is a medical document. Medical documents are intended to carry relevant information, facts as evident, and the clinical opinion of the practitioner. The medical note is intended as peer to peer communication and may appear blunt or direct. It is written in medical language and may contain abbreviations or verbiage that are unfamiliar.

## 2024-09-11 NOTE — DIETARY NOTE
Clinical Nutrition     Dietitian consult received per cardiac rehab standing order. Pt to be educated by cardiac rehab staff and encouraged to attend outpatient classes taught by ORIANA. ORIANA available PRN.    Fabiola Wei, ORIANA, LDN, [facility]  Clinical Dietitian  Spectra: [phone]

## 2024-09-11 NOTE — PHYSICAL THERAPY NOTE
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   Patient is currently functioning at baseline with bed mobility, transfers, gait, and stair negotiation. Prior to admission, patient's baseline is indep.     PLAN  Patient has been evaluated and presents with no skilled Physical Therapy needs at this time.  Patient discharged from Physical Therapy services.  Please re-order if a new functional limitation presents during this admission.    GOALS  Patient was able to achieve the following goals ...    Patient was able to transfer At previous, functional level  Safely and independently   Patient able to ambulate on level surfaces At previous, functional level  Safely and independently         HOME SITUATION  Type of Home: House   Home Layout: One level  Stairs to Enter : 2             Lives With: Daughter  Drives: No  Patient Owned Equipment: None       Prior Level of Frio: indep    SUBJECTIVE  Ipad  utilized       OBJECTIVE  Precautions:  needed (Aimee)  Fall Risk: Standard fall risk    WEIGHT BEARING RESTRICTION  Weight Bearing Restriction: None                PAIN ASSESSMENT  Ratin          COGNITION  Overall Cognitive Status:  WFL - within functional limits    RANGE OF MOTION AND STRENGTH ASSESSMENT  Upper extremity ROM and strength are within functional limits     Lower extremity ROM is within functional limits     Lower extremity strength is within functional limits       BALANCE  Static Sitting: Good  Dynamic Sitting: Good  Static  Standing: Good  Dynamic Standing: Good    ADDITIONAL TESTS                                    ACTIVITY TOLERANCE           BP: 102/85             O2 WALK       NEUROLOGICAL FINDINGS                        AM-PAC '6-Clicks' INPATIENT SHORT FORM - BASIC MOBILITY  How much difficulty does the patient currently have...  Patient Difficulty: Turning over in bed (including adjusting bedclothes, sheets and blankets)?: None   Patient Difficulty: Sitting down on and standing up from a chair with arms (e.g., wheelchair, bedside commode, etc.): None   Patient Difficulty: Moving from lying on back to sitting on the side of the bed?: None   How much help from another person does the patient currently need...   Help from Another: Moving to and from a bed to a chair (including a wheelchair)?: None   Help from Another: Need to walk in hospital room?: None   Help from Another: Climbing 3-5 steps with a railing?: None       AM-PAC Score:  Raw Score: 24   Approx Degree of Impairment: 0%   Standardized Score (AM-PAC Scale): 61.14   CMS Modifier (G-Code): CH    FUNCTIONAL ABILITY STATUS  Gait Assessment   Functional Mobility/Gait Assessment  Gait Assistance: Independent  Distance (ft): 200  Assistive Device: None  Pattern: Within Functional Limits  Stairs: Stairs  How Many Stairs: 5  Device: None  Assist: Modified independent  Pattern: Ascend and Descend  Ascend and Descend : Reciprocal    Skilled Therapy Provided     Bed Mobility:  Rolling: indep  Supine to sit: indep   Sit to supine: indep     Transfer Mobility:  Sit to stand: indep   Stand to sit: indep  Gait = indep, x1 standing rest break post stair training       Exercise/Education Provided:  Functional activity tolerated    Patient End of Session: In bed;Needs met;Call light within reach;RN aware of session/findings;All patient questions and concerns addressed;SCDs in place;Alarm set    Patient Evaluation Complexity Level:  History Moderate - 1 or 2 personal factors and/or  co-morbidities   Examination of body systems Moderate - addressing a total of 3 or more elements   Clinical Presentation  Moderate - Evolving   Clinical Decision Making Moderate Complexity       PT Session Time: 15 minutes  Gait Training:  minutes  Therapeutic Activity:  minutes  Neuromuscular Re-education:  minutes  Therapeutic Exercise:  minutes

## 2024-09-11 NOTE — PLAN OF CARE
Assumed care at 0730. Pt neuro intact. Patient's primary language is Gujarati.  used for assessments. Stable on RA. Pt normotensive. NSR/SB. While asleep, patient had intermittent missed QRS beats. Informed PAULO Macias. MARYANNE groin remains soft, no hematoma. Pt prefers to eat vegetarian food brought from home provided by family. Voids in bathroom. Pt denies pain. Up standby and ambulating hallway. Discussed plan of care with patient and patient's grandson. See doc flow sheet for vital signs and assessments.  ID's [id number], [id number].

## 2024-09-11 NOTE — PROGRESS NOTES
[facility] - CARDIOLOGY CONSULT NOTE    Samantha Beasley Patient Status:  Inpatient    1/3/1955 MRN [id number]   Location [facility] 6NE-A Attending Melodie Low MD   Hosp Day # 1 PCP No primary care provider on file.     Date of Admission:  9/10/2024  Date of Consult:  2024  I was asked by Melodie Low MD to provide recommendations for evaluation and management of cardiac issues.    S     Doing well this am, her mental status has completely recovered  Some dropped beats overnight, bradycardia    Results:     Lab Results   Component Value Date    WBC 8.9 2024    HGB 11.8 (L) 2024    HCT 36.1 2024    .0 2024    CREATSERUM 0.65 2024    BUN 7 (L) 2024     2024    K 3.7 2024     2024    CO2 25.0 2024    GLU 98 2024    CA 9.6 2024    ALB 3.7 2024    ALKPHO 57 2024    BILT 1.4 (H) 2024    TP 7.0 2024    AST 33 2024    ALT 19 2024    T4F 1.2 09/10/2024    TSH 7.222 (H) 09/10/2024    MG 2.1 2024    B12 221 2024       XR CHEST AP PORTABLE  (CPT=71045)    Result Date: 9/10/2024  CONCLUSION:  No acute radiographic findings.   LOCATION:  [facility]      Dictated by (CST): Ramos Sandoval MD on 9/10/2024 at 9:27 PM     Finalized by (CST): Ramos Sandoval MD on 9/10/2024 at 9:28 PM       CT SPINE CERVICAL (CPT=72125)    Result Date: 9/10/2024  CONCLUSION:  Negative for fracture or subluxation.    LOCATION:  [facility]   Dictated by (CST): Martin Quiroz MD on 9/10/2024 at 5:37 PM     Finalized by (CST): Martin Quiroz MD on 9/10/2024 at 5:40 PM       CTA CHEST + CT ABD (W) + CT PEL (W) (CPT=71275/18707)    Result Date: 9/10/2024  CONCLUSION:  1. Negative for pulmonary embolism. 2. No acute thoracic abnormality. 3. Aneurysmal dilatation of ascending thoracic aorta to a diameter 4.4 cm. 4. No acute abdominal-pelvic abnormality. 5. Details as above.  Continued clinical correlation  recommended.  Findings immediately discussed with Dr. Griggs from the ED by telephone.    LOCATION:  [facility]   Dictated by (CST): Martin Quiroz MD on 9/10/2024 at 5:10 PM     Finalized by (CST): Martin Quiroz MD on 9/10/2024 at 5:16 PM       CT BRAIN OR HEAD (CPT=70450)    Result Date: 9/10/2024  CONCLUSION:  No acute intracranial abnormality.  Findings immediately discussed with Dr. Griggs from the ED by telephone.    LOCATION:  [facility]   Dictated by (CST): Martin uQiroz MD on 9/10/2024 at 5:05 PM     Finalized by (CST): Martin Quiroz MD on 9/10/2024 at 5:08 PM      EKG 12 Lead    Result Date: 9/11/2024  Normal sinus rhythm Nonspecific ST and T wave abnormality Abnormal ECG When compared with ECG of 10-SEP-2024 16:57, DE interval has decreased Minimal criteria for Anterior infarct are no longer Present ST no longer elevated in Inferior leads Non-specific change in ST segment in Anterior leads T wave inversion now evident in Inferior leads    EKG    Result Date: 9/10/2024  Sinus rhythm with 1st degree A-V block Cannot rule out Anterior infarct , new Inferolateral injury pattern ** ** ACUTE MI / STEMI ** ** Consider right ventricular involvement in acute inferior infarct Abnormal ECG When compared with ECG of 10-SEP-2024 16:28, Acute Anterior infarct is now Present    EKG 12 Lead    Result Date: 9/10/2024  Sinus rhythm with 1st degree A-V block Nonspecific ST abnormality Abnormal ECG When compared with ECG of 10-SEP-2024 16:16, Sinus rhythm has replaced Junctional rhythm ST less elevated in Inferior leads ST no longer depressed in Lateral leads     Past Medical History  Past Medical History:    High blood pressure       Past Surgical History  Past Surgical History:   Procedure Laterality Date    Cataract      Fracture surgery      Hysterectomy         Family History  History reviewed. No pertinent family history.    Social History  Pediatric History   Patient Parents    Not on file     Other Topics Concern    Not on file    Social History Narrative    Not on file           Current Medications:  Current Facility-Administered Medications   Medication Dose Route Frequency    glucose (Dex4) 15 GM/59ML oral liquid 15 g  15 g Oral Q15 Min PRN    Or    glucose (Glutose) 40% oral gel 15 g  15 g Oral Q15 Min PRN    Or    glucose-vitamin C (Dex-4) chewable tab 4 tablet  4 tablet Oral Q15 Min PRN    Or    dextrose 50% injection 50 mL  50 mL Intravenous Q15 Min PRN    Or    glucose (Dex4) 15 GM/59ML oral liquid 30 g  30 g Oral Q15 Min PRN    Or    glucose (Glutose) 40% oral gel 30 g  30 g Oral Q15 Min PRN    Or    glucose-vitamin C (Dex-4) chewable tab 8 tablet  8 tablet Oral Q15 Min PRN    insulin aspart (NovoLOG) 100 Units/mL FlexPen 1-10 Units  1-10 Units Subcutaneous TID AC and HS    acetaminophen (Tylenol Extra Strength) tab 500 mg  500 mg Oral Q4H PRN    melatonin tab 3 mg  3 mg Oral Nightly PRN    ondansetron (Zofran) 4 MG/2ML injection 4 mg  4 mg Intravenous Q6H PRN    metoclopramide (Reglan) 5 mg/mL injection 10 mg  10 mg Intravenous Q8H PRN    heparin (Porcine) 5000 UNIT/ML injection 5,000 Units  5,000 Units Subcutaneous 2 times per day    polyethylene glycol (PEG 3350) (Miralax) 17 g oral packet 17 g  17 g Oral Daily PRN    sennosides (Senokot) tab 17.2 mg  17.2 mg Oral Nightly PRN    bisacodyl (Dulcolax) 10 MG rectal suppository 10 mg  10 mg Rectal Daily PRN    fleet enema (Fleet) rectal enema 133 mL  1 enema Rectal Once PRN    aspirin chewable tab 81 mg  81 mg Oral Daily    ticagrelor (Brilinta) tab 90 mg  90 mg Oral Q12H    atorvastatin (Lipitor) tab 80 mg  80 mg Oral Nightly     Medications Prior to Admission   Medication Sig    amLODIPine 5 MG Oral Tab Take 1 tablet (5 mg total) by mouth daily.    atenolol 50 MG Oral Tab Take 1 tablet (50 mg total) by mouth daily.     Allergies  No Known Allergies    Review of Systems:   10 pt ROS performed, separate from HPI  Review of Systems:  GENERAL: no fevers, chills, sweats  HEENT: no  visual or hearing changes  SKIN: denies any unusual skin lesions or rashes  RESPIRATORY: denies shortness of breath with exertion  CARDIOVASCULAR: no active chest pain, no claudication  GI: denies abdominal pain and denies heartburn  : no dysuria or hematuria  NEURO: denies headaches, focal weaknesses or paresthesias  All other systems were reviewed are negative  Physical Exam:   Blood pressure 113/87, pulse 58, temperature 99.3 °F (37.4 °C), temperature source Temporal, resp. rate 18, weight 139 lb 12.4 oz (63.4 kg), SpO2 96%.    Scheduled Meds:    insulin aspart  1-10 Units Subcutaneous TID AC and HS    heparin  5,000 Units Subcutaneous 2 times per day    aspirin  81 mg Oral Daily    ticagrelor  90 mg Oral Q12H    atorvastatin  80 mg Oral Nightly         Physical Exam:    General: Alert and oriented. No apparent distress. No respiratory or constitutional distress.  HEENT: Normocephalic, anicteric sclera, neck supple  Neck: No JVD, carotids 2+, supple  Cardiac: Regular rate. No pathologic murmur.  Lungs: Clear with normal effort.  Normal excursions and effort.  Abdomen: Soft, non-tender. BS-present.  Extremities: Without clubbing, cyanosis.  Peripheral pulsespresent.  Neurologic: Alert and oriented, normal affect. Motor ok.  Skin: Warm and dry.   Access site: cdi, no hematoma, +2 pulses    Imaging: I independently visualized all relevant chest imaging in PACS, agree with radiology interpretation except where noted.  Thank you for allowing me to participate in the care of your patient.    Labs:  HEM:  Recent Labs   Lab 09/10/24  1619 09/11/24  0453   WBC 9.3 8.9   HGB 11.2* 11.8*   .0 255.0       Chem:  Recent Labs   Lab 09/10/24  1619 09/11/24  0453    139   K 3.8 3.7    106   CO2 25.0 25.0   BUN 9 7*   CREATSERUM 0.84 0.65   CA 9.0 9.6   MG  --  2.1   * 98       Recent Labs   Lab 09/10/24  1619 09/11/24  0453   ALT 20 19   AST 30 33   ALB 3.6 3.7       No results for input(s): \"TROP\",  \"CK\" in the last 168 hours.    No results for input(s): \"PTP\", \"INR\" in the last 168 hours.    Impression:   # Inferior STEMI- s/p PCI of the RCA with GREGG x1  # CAD- has residual LAD disease- plan for PCI on Thursday   # Bradycardia, occasional heart block on tele- hold on BB, suspect will recover   # Ascending aortic aneurysm- 4.4 cm on CT    Recommendations:  Continue ASA and brillinta   Continue high intensity statin  Hold on BB  Check echo  NPO at midnight for PCI tomorrow    C3    Marcial Mercado MD  [facility]  9/11/2024

## 2024-09-11 NOTE — PLAN OF CARE
Pt orientated x4. VSS. On RA. Rt groin soft, no hematoma. Straight cath x 1 while lying flat post cath (pt unable to void). Ambulated to bathroom without difficulty and voided once bedrest over. SCD's on. Grandson at bedside and translating. Translation services offered and declined. Denies pain. Plan for cath lab possibly Thursday

## 2024-09-12 ENCOUNTER — APPOINTMENT (OUTPATIENT)
Dept: INTERVENTIONAL RADIOLOGY/VASCULAR | Facility: HOSPITAL | Age: 69
End: 2024-09-12
Attending: NURSE PRACTITIONER
Payer: COMMERCIAL

## 2024-09-12 LAB — POTASSIUM SERPL-SCNC: 4.1 MMOL/L (ref 3.5–5.1)

## 2024-09-12 PROCEDURE — 4A033BC MEASUREMENT OF ARTERIAL PRESSURE, CORONARY, PERCUTANEOUS APPROACH: ICD-10-PCS | Performed by: INTERNAL MEDICINE

## 2024-09-12 PROCEDURE — B41FYZZ FLUOROSCOPY OF RIGHT LOWER EXTREMITY ARTERIES USING OTHER CONTRAST: ICD-10-PCS | Performed by: INTERNAL MEDICINE

## 2024-09-12 PROCEDURE — 4A023N7 MEASUREMENT OF CARDIAC SAMPLING AND PRESSURE, LEFT HEART, PERCUTANEOUS APPROACH: ICD-10-PCS | Performed by: INTERNAL MEDICINE

## 2024-09-12 PROCEDURE — B210YZZ FLUOROSCOPY OF SINGLE CORONARY ARTERY USING OTHER CONTRAST: ICD-10-PCS | Performed by: INTERNAL MEDICINE

## 2024-09-12 PROCEDURE — 99232 SBSQ HOSP IP/OBS MODERATE 35: CPT | Performed by: STUDENT IN AN ORGANIZED HEALTH CARE EDUCATION/TRAINING PROGRAM

## 2024-09-12 RX ORDER — HEPARIN SODIUM 5000 [USP'U]/ML
INJECTION, SOLUTION INTRAVENOUS; SUBCUTANEOUS
Status: COMPLETED
Start: 2024-09-12 | End: 2024-09-12

## 2024-09-12 RX ORDER — MIDAZOLAM HYDROCHLORIDE 1 MG/ML
INJECTION INTRAMUSCULAR; INTRAVENOUS
Status: COMPLETED
Start: 2024-09-12 | End: 2024-09-12

## 2024-09-12 RX ORDER — LIDOCAINE HYDROCHLORIDE 10 MG/ML
INJECTION, SOLUTION EPIDURAL; INFILTRATION; INTRACAUDAL; PERINEURAL
Status: COMPLETED
Start: 2024-09-12 | End: 2024-09-12

## 2024-09-12 RX ORDER — SODIUM CHLORIDE 9 MG/ML
INJECTION, SOLUTION INTRAVENOUS
Status: DISCONTINUED | OUTPATIENT
Start: 2024-09-13 | End: 2024-09-12 | Stop reason: HOSPADM

## 2024-09-12 NOTE — PLAN OF CARE
Pt arrived from cath lab @ 1500 in stable condition. Q15min groin checks C/D/I/soft/no hematoma until 1545 when baseball-size hematoma noted on L groin. Manual pressure held for 30 min. Hemostasis achieved, groin checks thereafter WNL minus some ecchymosis.     Pt transported to 2603 without incident. Groin checked after transferring to floor bed @ 1820. C/D/I, soft, no hematoma, some ecchymosis.

## 2024-09-12 NOTE — PLAN OF CARE
9/11 2000 Patient alert and  oriented x 4.Denies chest pain and shortness of breath. No signs of distress. Her groin is soft, no signs of bleeding. Pedal pulse palpable.Due meds given. PRN pain meds available.Tolerated ambulating in the room. Plan for NPO after midnight for cardiac catheterization tomorrow- PCI to LAD.. Follow up labs ordered for tomorrow Falls and safety precautions maintained.   9/12 0347 With sleep, patient's get bradycardic- heart block- 2nd degree. Heart rate 34-high 40's. Unsustained. 's. Patient asymptomatic. [facility] APN notified. No new orders. Will continue to monitor.       Problem: CARDIOVASCULAR - ADULT  Goal: Absence of cardiac arrhythmias or at baseline  Description: INTERVENTIONS:  - Continuous cardiac monitoring, monitor vital signs, obtain 12 lead EKG if indicated  - Evaluate effectiveness of antiarrhythmic and heart rate control medications as ordered  - Initiate emergency measures for life threatening arrhythmias  - Monitor electrolytes and administer replacement therapy as ordered  9/11/2024 2239 by Olayinka Prakash, RN  Outcome: Progressing     Problem: METABOLIC/FLUID AND ELECTROLYTES - ADULT  Goal: Electrolytes maintained within normal limits  Description: INTERVENTIONS:  - Monitor labs and rhythm and assess patient for signs and symptoms of electrolyte imbalances  - Administer electrolyte replacement as ordered  - Monitor response to electrolyte replacements, including rhythm and repeat lab results as appropriate  - Fluid restriction as ordered  - Instruct patient on fluid and nutrition restrictions as appropriate  9/11/2024 2239 by Olayinka Prakash, RN  Outcome: Progressing     Problem: SKIN/TISSUE INTEGRITY - ADULT  Goal: Incision(s), wounds(s) or drain site(s) healing without S/S of infection  Description: INTERVENTIONS:  - Assess and document dressing/incision and surrounding tissue  9/11/2024 2239 by Olayinka Prakash, RN  Outcome: Progressing     Problem:  Patient/Family Goals  Goal: Patient/Family Long Term Goal  Description: Patient's Long Term Goal: Control BP    Interventions:  - Take meds as prescribed.   - Follow up with PCP in [facility]    9/11/2024 2239 by Olayinka Prakash, RN  Outcome: Progressing  Goal: Patient/Family Short Term Goal  Description: Patient's Short Term Goal: to get up and walk around    9/11 NOC \" able to sleep\"    Interventions:   - follow md poc  -cluster care  - sleep aid kit    9/11/2024 2239 by Olayinka Prakash, RN  Outcome: Progressing

## 2024-09-12 NOTE — PROGRESS NOTES
[facility]   part of [facility]     Cardiology Progress Note        Samantha Beasley Patient Status:  Inpatient    1/3/1955 MRN [id number]   Location [facility] 6NE-A Attending Melodie Low MD   Hosp Day # 2 PCP No primary care provider on file.     Entire visit conducted with use of  service    Subjective/ROS:  No chest pain or sob. Denies any lightheadedness or dizziness.  Denies palpitations. Understands she is on scheduled for staged PCI and agreeable to proceed. Does not have any questions for me regarding the procedure.    Objective:  /72 (BP Location: Left arm)   Pulse 68   Temp 98.7 °F (37.1 °C) (Temporal)   Resp 26   Wt 134 lb 7.7 oz (61 kg)   SpO2 95%     Temp (24hrs), Av.2 °F (36.8 °C), Min:97.3 °F (36.3 °C), Max:99 °F (37.2 °C)      Tele  Sr with transient episodes of second degree type I and II hb    Intake/Output:    Intake/Output Summary (Last 24 hours) at 2024 0843  Last data filed at 2024 0800  Gross per 24 hour   Intake 845 ml   Output 1075 ml   Net -230 ml       Patient Weight for the past 72 hrs:   Weight   09/10/24 1619 160 lb (72.6 kg)   09/10/24 1944 160 lb (72.6 kg)   24 0400 139 lb 12.4 oz (63.4 kg)   24 0300 134 lb 7.7 oz (61 kg)        Physical Exam:    Gen: alert, oriented x 3, NAD  Heent: pupils equal, reactive. Mucous membranes moist.   Neck: no jvd  Cardiac: regular rate and rhythm, normal S1,S2  Lungs: diminished bs bilaterally throughout  Abd: soft, NT/ND +bs  Ext: no edema. Right groin soft.   Skin: Warm, dry  Neuro: No focal deficits    Labs:  Lab Results   Component Value Date    K 4.1 2024       CXR: Reviewed images from 9/10. No acute process    Medications:     heparin  5,000 Units Subcutaneous 2 times per day    aspirin  81 mg Oral Daily    ticagrelor  90 mg Oral Q12H    atorvastatin  80 mg Oral Nightly         Assessment:    Acute inferior stemi s/p emergent pci 100% pRCA- hemodynamically stable, on  DAPT  Cad with residual LAD disease- plan for staged intervention today   Second degree hb- types 1 and 2- transient episodes. Not on bb because of this  HL- LDL 85, new on statin   Htn- controlled despite being off home norvasc and atenolol  1 loose stool without recurrence- off isolation at 11 am provided no further stools    Plan:     Continue DAPT, statin  Plan for staged PCI LAD today   To remain off bb with intermittent, transient heart block. Continue to follow this    Discussed plan of care with patient via use of  and nursing.       Stephanie Ontiveros NP  [phone]      Physician addendum:   I have a seen and examined he patient independently and agree with plan as outlined above.    70 yo F presenting with inferior wall MI s/p PCI to RCA. Residual LAD disease. Plan for PCI today. She has intermittent heart block, CTM I think will resolve. Continue asa and brillinta, high intensity statin. BP okay off meds right now. Echo showed normal EF and RV function.     L3    Marcial Mercado MD  ITV Cardiology   [facility]

## 2024-09-12 NOTE — PROCEDURES
[facility]   part of [facility]    Cardiac Catheterization Note    Primary Proceduralist: Marcial Mercado MD  Procedure Performed: COR, iFR of LAD  Date of Procedure: 9/12/2024   Indication: STEMI, staged procedure to complete revascularization  Pre Operative Diagnosis: Severe LAD  Post Operative Diagnosis: IFR negative LAD  Estimated blood loss: Less than 5 cc  Specimens: None    Consent:   Informed written consent was obtained from the patient after risk benefits and alternative explained the patient.  Patient agreed to proceed    Sedation  IV conscious sedation was achieved with Versed and fentanyl.  It was administered under my direct supervision.  Hemodynamic monitoring took place throughout the entire procedure by myself in the Cath Lab staff.  2 mg of Versed and 11 mcg of Fentanyl were given.  Start Time: 1417 end Time: 1446      Description of the procedure: After written informed consent was obtained from the patient, patient was brought to the cardiac catheterization laboratory in a stable condition and fasting state.  Patient was prepped and draped in the usual sterile fashion.  IV conscious sedation was achieved with Versed and fentanyl.  Lidocaine 1% was used to infiltrate the right femoral artery, ultrasound was used to obtain access with micropuncture needle followed by insertion of 6 Hungarian sheath.  EBU 4.0 was advanced over a wire selective angiography was performed of the left system.  After multiple views of the LAD lesion it appeared less significant than on prior imaging, 50%.  iFR was used to interrogate the lesion which was 0.92, which is nonsignificant therefore PCI was aborted.  JR4 catheter was advanced over wire and selective angiogram was performed of the RCA.      Coronary Angiogram Findings:  LM: Large caliber artery giving rise to LAD & LCX.  LAD: Large caliber artery giving rise to diagonal and septal branches.  Moderate mid vessel lesion, severe distal  apical lesion  LCX: Medium to large caliber artery giving rise to OM branches.  Tortuous, no significant lesions  RCA: Large caliber artery giving rise to acute marginal branches, and bifurcates into RPDA & RPL.  Patent stent mild diffuse disease.    Intervention:  Guide: EBU 4.0  Wire: iFR wire  iFR : 0.92  Following IFR measurement wire was removed repeat angiography was performed.    Monitored sedation administered by the cath lab RN, and supervised throughout the procedure by myself. Total time 29 minutes.     Closure: Femoral angiogram performed.  Perclose was deployed    No immediate complications.    A/P:  Nonsignificant lesion in the LAD which we medically managed, RCA stent patent.  Continue aspirin, given likely inability to afford Brilinta, load with 300 of Plavix tomorrow morning and start 75 mg daily  Atorvastatin 80 mg daily  4 hours bedrest  Return to CCU likely can be transferred to the floor.    Marcial Mercado MD  Interventional Cardiology  [facility]

## 2024-09-12 NOTE — PROGRESS NOTES
Notified by Rn pt with HR 30s-40s while sleeping. RN states monitor showed 2nd degree heart block. Continue to monitor for now. No change to plan of care at this time.

## 2024-09-12 NOTE — PROGRESS NOTES
[facility]   part of [facility]     Hospitalist Progress Note     Samantha Beasley Patient Status:  Inpatient    1/3/1955 MRN [id number]   Location [facility] 6NE-A Attending Melodie Low MD   Hosp Day # 2 PCP No primary care provider on file.     Chief Complaint: STEMI     Subjective:     Patient seen in AM, no SOB,     Objective:    Review of Systems:   A comprehensive review of systems was completed; pertinent positive and negatives stated in subjective.    Vital signs:  Temp:  [97.3 °F (36.3 °C)-99 °F (37.2 °C)] 98.7 °F (37.1 °C)  Pulse:  [50-72] 70  Resp:  [15-36] 36  BP: ()/(55-96) 159/96  SpO2:  [93 %-98 %] 96 %    Physical Exam:    General: No acute distress  Respiratory: No wheezes, no rhonchi  Cardiovascular: S1, S2, regular rate and rhythm  Abdomen: Soft, Non-tender, non-distended, positive bowel sounds  Neuro: No new focal deficits.   Extremities: No edema    Diagnostic Data:    Labs:  Recent Labs   Lab 09/10/24  1619 09/11/24  0453   WBC 9.3 8.9   HGB 11.2* 11.8*   MCV 91.4 88.3   .0 255.0       Recent Labs   Lab 09/10/24  1619 24  0453 24  0448   * 98  --    BUN 9 7*  --    CREATSERUM 0.84 0.65  --    CA 9.0 9.6  --    ALB 3.6 3.7  --     139  --    K 3.8 3.7 4.1    106  --    CO2 25.0 25.0  --    ALKPHO 51* 57  --    AST 30 33  --    ALT 20 19  --    BILT 0.6 1.4*  --    TP 6.5 7.0  --        CrCl cannot be calculated (Unknown ideal weight.).    Recent Labs   Lab 09/10/24  1619   TROPHS 135*       No results for input(s): \"PTP\", \"INR\" in the last 168 hours.                 Microbiology    No results found for this visit on 09/10/24.      Imaging: Reviewed in Epic.    Medications:    [START ON 2024] sodium chloride   Intravenous On Call    heparin  5,000 Units Subcutaneous 2 times per day    aspirin  81 mg Oral Daily    ticagrelor  90 mg Oral Q12H    atorvastatin  80 mg Oral Nightly       Assessment & Plan:         #STEMI  #Bradycardia  -Cardiology consulted from the emergency room  -CTA chest/abdomen/pelvis ordered to rule out dissection, negative study  -s/p LHC on 9/10 (GREGG to proximal RCA), severe proximal LAD - plan for LHC on 9/12/24  -A1c, lipid panel  -DAPT, bb, statin per cards  -echo     #Essential HTN  -unsure what meds pt on but she says \"I have bp issues\"     #Syncope  -2/2 above  -CT cervical spine, CT brain (9/10): Negative for acute process  -metabolic crisostomo    #Hyperglycemia  -no previous records, check A1c  -ISS for now    #Normocytic anemia      #Incidental aneurysmal dilatation of ascending thoracic aorta to diameter 4.4 cm  -cards following    Plan of care discussed w pt using Aimee      Melodie Low MD    Supplementary Documentation:     Quality:  DVT Mechanical Prophylaxis:   SCDs,    DVT Pharmacologic Prophylaxis   Medication    heparin (Porcine) 5000 UNIT/ML injection 5,000 Units      DVT Pharmacologic prophylaxis: Aspirin 162 mg         Code Status: Not on file  Syed: No urinary catheter in place  Syed Duration (in days):   Central line:    JOSE:     Discharge is dependent on: clinical     At this point Ms. Beasley is expected to be discharge to: home ?    The 21st Century Cures Act makes medical notes like these available to patients in the interest of transparency. Please be advised this is a medical document. Medical documents are intended to carry relevant information, facts as evident, and the clinical opinion of the practitioner. The medical note is intended as peer to peer communication and may appear blunt or direct. It is written in medical language and may contain abbreviations or verbiage that are unfamiliar.

## 2024-09-13 RX ORDER — CLOPIDOGREL BISULFATE 75 MG/1
75 TABLET ORAL DAILY
Status: DISCONTINUED | OUTPATIENT
Start: 2024-09-14 | End: 2024-09-14

## 2024-09-13 RX ORDER — ATORVASTATIN CALCIUM 80 MG/1
80 TABLET, FILM COATED ORAL NIGHTLY
Qty: 90 TABLET | Refills: 0 | Status: SHIPPED | OUTPATIENT
Start: 2024-09-13 | End: 2024-09-14

## 2024-09-13 RX ORDER — CLOPIDOGREL BISULFATE 75 MG/1
300 TABLET ORAL ONCE
Status: COMPLETED | OUTPATIENT
Start: 2024-09-13 | End: 2024-09-13

## 2024-09-13 NOTE — DISCHARGE INSTRUCTIONS
[facility] [address] [address]  [phone]  [email or web address]    To inquire about [place], call [facility] [phone] or visit [email or web address]        HOME CARE INSTRUCTIONS FOLLOWING CORONARY ANGIOGRAPHY, ANGIOPLASTY (PTCA/PTA) OR INSERTION OF STENT IN THE CORONARY ARTERIES        Activity  DO NOT drive after the procedure.  You may resume driving late the following day according to the nurse or physician's instructions  Plan on resting and relaxing tonight and tomorrow  Resume your normal activity after 48 hours, or as instructed by your physician  Do not lift anything over 10 pounds for the next 24 hours  Avoid drinking alcohol for the next 24 hours  If the groin site was used, avoid repeated stair use and excessive walking for the next 24 hours  If the wrist was used, avoid bending/flexing of the wrist for the next 24 hours     What is Normal?  A small lump at the procedure site associated with mild tenderness when touched  The procedure site may be bruised or discolored  There may be a small amount of drainage on the bandage     Special Instructions  Drink plenty of fluids during the next 24 hours to \"flush\" the contrast from your system  The bandage is to remain in place for 24 hours  Keep the bandage clean and dry  DO NOT submerge the procedure site for 72 hours (no bath tubs or pools)  This includes dishwashing/submersion of the wrist, if the wrist was used  After 24 hours, you must remove the bandage  You should shower after removing the bandage, and wash the procedure site gently with soap and water  If you choose to wear a bandage for a few days, make sure it remains clean and dry and that it is changed daily     When you should NOTIFY YOUR PHYSICIAN  Bleeding can occur at the procedure site - both on the outside of the skin and/or beneath the surface of the skin  Swelling or a large lump at the procedure site can occur, which may  be accompanied by moderate to severe pain     If either of the above occurs, lie down flat.  Have someone apply pressure to the procedure site with both hands, as instructed by the nurse.  Hold pressure for 20 minutes and the bleeding should stop.  Notify your physician of the occurrence  If the bleeding does not stop, call 911 and continue to apply pressure     If you experience signs of a fever, temperature > 101°, chills, infection (redness, swelling, thick yellow drainage, or a foul odor from the procedure site)  If you notice any numbness, tingling, or loss of feeling to your leg or foot or groin access  If you notice any numbness, tingling, or loss of feeling to your fingers or hand, if wrist access was utilized     If You Received a Stent:     You will remain on an antiplatelet drug and/or aspirin.  Antiplatelet medications are usually taken for six months to one year and should not be stopped unless your cardiologist directs you to do so.  These medications help to prevent blockage at the stent site.  If another physician or dentist asks you to stop your antiplatelet medication, you need to consult your cardiologist first.  Together, your cardiologist and other physician can discuss the risks that may be involved if you are not taking the antiplatelet medication   If an MRI is necessary, it may be done 4-6 weeks after your procedure.  Verify this with your cardiologist  Keep your stent card with you at all times!  If you need an MRI in the future, your stent card will need to be shown to the technologist before performing the MRI.  A duplicate card CANNOT be reproduced.     Other  You may resume your present diet, unless otherwise specified by your physician.  You may resume all of your medications as prescribed, unless otherwise directed by your physician.  A list of your medications was provided to you at discharge.  Continue the walking program initiated in the hospital and progress your walking as  directed.  Or, gradually resume your previous aerobic exercise schedule as tolerated.  Please call your physician’s office for a follow-up appointment.  You should be seen in 2 weeks.

## 2024-09-13 NOTE — CARDIAC REHAB
Pt not appropriate for Phase 2 cardiac rehab at this time. CAD education reviewed with son and stent card given to him.

## 2024-09-13 NOTE — PROGRESS NOTES
[facility] - IT CARDIOLOGY CONSULT NOTE    Samantha Beasley Patient Status:  Inpatient    1/3/1955 MRN [id number]   Location [facility] 6NE-A Attending Melodie Low MD   Hosp Day # 3 PCP No primary care provider on file.     Date of Admission:  9/10/2024  Date of Consult:  2024  I was asked by Melodie Low MD to provide recommendations for evaluation and management of cardiac issues.    S     Doing well, no chest pain. Tenderness in the L fem access site.     4 sec pause overnight.     Results:     Lab Results   Component Value Date    WBC 8.9 2024    HGB 11.8 (L) 2024    HCT 36.1 2024    .0 2024    CREATSERUM 0.65 2024    BUN 7 (L) 2024     2024    K 4.1 2024     2024    CO2 25.0 2024    GLU 98 2024    CA 9.6 2024    ALB 3.7 2024    ALKPHO 57 2024    BILT 1.4 (H) 2024    TP 7.0 2024    AST 33 2024    ALT 19 2024    T4F 1.2 09/10/2024    TSH 7.222 (H) 09/10/2024    MG 2.1 2024    B12 221 2024       No results found.        Past Medical History  Past Medical History:    High blood pressure       Past Surgical History  Past Surgical History:   Procedure Laterality Date    Cataract      Fracture surgery      Hysterectomy         Family History  History reviewed. No pertinent family history.    Social History  Pediatric History   Patient Parents    Not on file     Other Topics Concern    Not on file   Social History Narrative    Not on file           Current Medications:  Current Facility-Administered Medications   Medication Dose Route Frequency    [START ON 2024] clopidogrel (Plavix) tab 75 mg  75 mg Oral Daily    glucose (Dex4) 15 GM/59ML oral liquid 15 g  15 g Oral Q15 Min PRN    Or    glucose (Glutose) 40% oral gel 15 g  15 g Oral Q15 Min PRN    Or    glucose-vitamin C (Dex-4) chewable tab 4 tablet  4 tablet Oral Q15 Min PRN    Or    dextrose 50%  injection 50 mL  50 mL Intravenous Q15 Min PRN    Or    glucose (Dex4) 15 GM/59ML oral liquid 30 g  30 g Oral Q15 Min PRN    Or    glucose (Glutose) 40% oral gel 30 g  30 g Oral Q15 Min PRN    Or    glucose-vitamin C (Dex-4) chewable tab 8 tablet  8 tablet Oral Q15 Min PRN    acetaminophen (Tylenol Extra Strength) tab 500 mg  500 mg Oral Q4H PRN    melatonin tab 3 mg  3 mg Oral Nightly PRN    ondansetron (Zofran) 4 MG/2ML injection 4 mg  4 mg Intravenous Q6H PRN    metoclopramide (Reglan) 5 mg/mL injection 10 mg  10 mg Intravenous Q8H PRN    heparin (Porcine) 5000 UNIT/ML injection 5,000 Units  5,000 Units Subcutaneous 2 times per day    polyethylene glycol (PEG 3350) (Miralax) 17 g oral packet 17 g  17 g Oral Daily PRN    sennosides (Senokot) tab 17.2 mg  17.2 mg Oral Nightly PRN    bisacodyl (Dulcolax) 10 MG rectal suppository 10 mg  10 mg Rectal Daily PRN    fleet enema (Fleet) rectal enema 133 mL  1 enema Rectal Once PRN    aspirin chewable tab 81 mg  81 mg Oral Daily    atorvastatin (Lipitor) tab 80 mg  80 mg Oral Nightly     Medications Prior to Admission   Medication Sig    amLODIPine 5 MG Oral Tab Take 1 tablet (5 mg total) by mouth daily.    atenolol 50 MG Oral Tab Take 1 tablet (50 mg total) by mouth daily.     Allergies  No Known Allergies    Review of Systems:   10 pt ROS performed, separate from HPI  Review of Systems:  GENERAL: no fevers, chills, sweats  HEENT: no visual or hearing changes  SKIN: denies any unusual skin lesions or rashes  RESPIRATORY: denies shortness of breath with exertion  CARDIOVASCULAR: no active chest pain, no claudication  GI: denies abdominal pain and denies heartburn  : no dysuria or hematuria  NEURO: denies headaches, focal weaknesses or paresthesias  All other systems were reviewed are negative  Physical Exam:   Blood pressure 122/78, pulse 82, temperature 99.6 °F (37.6 °C), temperature source Oral, resp. rate 21, weight 126 lb 15.8 oz (57.6 kg), SpO2 97%.    Scheduled  Meds:    [START ON 9/14/2024] clopidogrel  75 mg Oral Daily    heparin  5,000 Units Subcutaneous 2 times per day    aspirin  81 mg Oral Daily    atorvastatin  80 mg Oral Nightly         Physical Exam:    General: Alert and oriented. No apparent distress. No respiratory or constitutional distress.  HEENT: Normocephalic, anicteric sclera, neck supple  Neck: No JVD, carotids 2+, supple  Cardiac: Regular rate. No pathologic murmur.  Lungs: Clear with normal effort.  Normal excursions and effort.  Abdomen: Soft, non-tender. BS-present.  Extremities: Without clubbing, cyanosis.  Peripheral pulsespresent.  Neurologic: Alert and oriented, normal affect. Motor ok.  Skin: Warm and dry.   Access site: cdi, no hematoma, +2 pulses    Imaging: I independently visualized all relevant chest imaging in PACS, agree with radiology interpretation except where noted.  Thank you for allowing me to participate in the care of your patient.    Labs:  HEM:  Recent Labs   Lab 09/10/24  1619 09/11/24  0453   WBC 9.3 8.9   HGB 11.2* 11.8*   .0 255.0       Chem:  Recent Labs   Lab 09/10/24  1619 09/11/24  0453 09/12/24  0448    139  --    K 3.8 3.7 4.1    106  --    CO2 25.0 25.0  --    BUN 9 7*  --    CREATSERUM 0.84 0.65  --    CA 9.0 9.6  --    MG  --  2.1  --    * 98  --        Recent Labs   Lab 09/10/24  1619 09/11/24  0453   ALT 20 19   AST 30 33   ALB 3.6 3.7       No results for input(s): \"TROP\", \"CK\" in the last 168 hours.    No results for input(s): \"PTP\", \"INR\" in the last 168 hours.    Impression:   # Inferior STEMI- s/p PCI of the RCA with GREGG x1  # CAD- LAD is iFR negative 0.92  # Bradycardia, occasional heart block on tele, pause- hold on BB, suspect will recover . EP was consulted   # Ascending aortic aneurysm- 4.4 cm on CT    Recommendations:  Continue ASA, switch to plavix today 300 , then 75 mg   Continue high intensity statin  Hold on BB  MCT on discharge   Would keep until tomorrow     C2    Marcial  MD Jess  [facility]

## 2024-09-13 NOTE — PLAN OF CARE
Patient alert and oriented x 4. On room air. Sinus on cardiac monitor. Patient denies any chest pain or chest discomfort at this time. Patient voids, last BM 9/11. S/p cardiac cath , RT Groin site intact and soft, no hematoma now some stable ecchymosis .v/s good , pulses normal ,lungs sounds clear , abdomen soft and +bs, +pp, Ambulated on [address] with this staff,  no acute distress noted ,Brilianta and subcutaneous heparin continued, scds started, IS doing good,Plan of care updated, all questions answered. Safety precautions in place. Bed alarm on. Will continue to monitor tele/labs/vital signs closely.     Plan:   Labs in am, monitor incision site , tele, v/s, lab, Discharge plan today    Problem: Patient/Family Goals  Goal: Patient/Family Long Term Goal  Description: Patient's Long Term Goal: DISCHARGE TO HOME SOON    Interventions:  - Take meds as prescribed.   - [place]    Outcome: Progressing  Goal: Patient/Family Short Term Goal  Description: Patient's Short Term Goal: to get up and walk around    9/11 NOC \" able to sleep\"    Interventions:   - follow md poc  -cluster care  - sleep aid kit    Outcome: Progressing     Problem: CARDIOVASCULAR - ADULT  Goal: Maintains optimal cardiac output and hemodynamic stability  Description: INTERVENTIONS:  - Monitor vital signs, rhythm, and trends  - Monitor for bleeding, hypotension and signs of decreased cardiac output  - Evaluate effectiveness of vasoactive medications to optimize hemodynamic stability  - Monitor arterial and/or venous puncture sites for bleeding and/or hematoma  - Assess quality of pulses, skin color and temperature  - Assess for signs of decreased coronary artery perfusion - ex. Angina  - Evaluate fluid balance, assess for edema, trend weights  Outcome: Progressing  Goal: Absence of cardiac arrhythmias or at baseline  Description: INTERVENTIONS:  - Continuous cardiac monitoring, monitor vital signs, obtain 12 lead EKG if indicated  -  Evaluate effectiveness of antiarrhythmic and heart rate control medications as ordered  - Initiate emergency measures for life threatening arrhythmias  - Monitor electrolytes and administer replacement therapy as ordered  Outcome: Progressing     Problem: METABOLIC/FLUID AND ELECTROLYTES - ADULT  Goal: Electrolytes maintained within normal limits  Description: INTERVENTIONS:  - Monitor labs and rhythm and assess patient for signs and symptoms of electrolyte imbalances  - Administer electrolyte replacement as ordered  - Monitor response to electrolyte replacements, including rhythm and repeat lab results as appropriate  - Fluid restriction as ordered  - Instruct patient on fluid and nutrition restrictions as appropriate  Outcome: Progressing     Problem: SKIN/TISSUE INTEGRITY - ADULT  Goal: Incision(s), wounds(s) or drain site(s) healing without S/S of infection  Description: INTERVENTIONS:  - Assess and document dressing/incision and surrounding tissue  Outcome: Progressing     Problem: Diabetes/Glucose Control  Goal: Glucose maintained within prescribed range  Description: INTERVENTIONS:  - Monitor Blood Glucose as ordered  - Assess for signs and symptoms of hyperglycemia and hypoglycemia  - Administer ordered medications to maintain glucose within target range  - Assess barriers to adequate nutritional intake and initiate nutrition consult as needed  - Instruct patient on self management of diabetes  Outcome: Progressing

## 2024-09-13 NOTE — PROGRESS NOTES
[facility]   part of [facility]     Hospitalist Progress Note     Samantha Beasley Patient Status:  Inpatient    1/3/1955 MRN [id number]   Location [facility] 6NE-A Attending Melodie Low MD   Hosp Day # 3 PCP No primary care provider on file.     Chief Complaint: STEMI     Subjective:     Patient doing well.     Objective:    Review of Systems:   A comprehensive review of systems was completed; pertinent positive and negatives stated in subjective.    Vital signs:  Temp:  [97.9 °F (36.6 °C)-99.6 °F (37.6 °C)] 99.6 °F (37.6 °C)  Pulse:  [65-89] 82  Resp:  [19-33] 21  BP: (107-149)/(60-92) 122/78  SpO2:  [92 %-98 %] 97 %    Physical Exam:    General: No acute distress  Respiratory: No wheezes, no rhonchi  Cardiovascular: S1, S2, regular rate and rhythm  Abdomen: Soft, Non-tender, non-distended, positive bowel sounds  Neuro: No new focal deficits.   Extremities: No edema    Diagnostic Data:    Labs:  Recent Labs   Lab 09/10/24  1619 09/11/24  0453   WBC 9.3 8.9   HGB 11.2* 11.8*   MCV 91.4 88.3   .0 255.0       Recent Labs   Lab 09/10/24  1619 24  0453 24  0448   * 98  --    BUN 9 7*  --    CREATSERUM 0.84 0.65  --    CA 9.0 9.6  --    ALB 3.6 3.7  --     139  --    K 3.8 3.7 4.1    106  --    CO2 25.0 25.0  --    ALKPHO 51* 57  --    AST 30 33  --    ALT 20 19  --    BILT 0.6 1.4*  --    TP 6.5 7.0  --        CrCl cannot be calculated (Unknown ideal weight.).    Recent Labs   Lab 09/10/24  1619   TROPHS 135*       No results for input(s): \"PTP\", \"INR\" in the last 168 hours.                 Microbiology    No results found for this visit on 09/10/24.      Imaging: Reviewed in Epic.    Medications:    clopidogrel  300 mg Oral Once    [START ON 2024] clopidogrel  75 mg Oral Daily    heparin  5,000 Units Subcutaneous 2 times per day    aspirin  81 mg Oral Daily    atorvastatin  80 mg Oral Nightly       Assessment & Plan:        #STEMI  #Bradycardia  -Cardiology  consulted from the emergency room  -CTA chest/abdomen/pelvis ordered to rule out dissection, negative study  -s/p LHC on 9/10 (GREGG to proximal RCA), severe proximal LAD - plan for LHC on 9/12/24  -A1c, lipid panel  -DAPT, bb, statin per cards  -echo     #Essential HTN  -unsure what meds pt on but she says \"I have bp issues\"     #Syncope  -2/2 above  -CT cervical spine, CT brain (9/10): Negative for acute process  -metabolic crisostomo    #Hyperglycemia  -no previous records, check A1c  -ISS for now    #Normocytic anemia      #Incidental aneurysmal dilatation of ascending thoracic aorta to diameter 4.4 cm  -cards following    DC today if cleared by cards, discussed uninterrupted DAPt thx w rosie Low MD    Supplementary Documentation:     Quality:  DVT Mechanical Prophylaxis:   SCDs,    DVT Pharmacologic Prophylaxis   Medication    heparin (Porcine) 5000 UNIT/ML injection 5,000 Units      DVT Pharmacologic prophylaxis: Aspirin 162 mg         Code Status: Not on file  Syed: No urinary catheter in place  Syed Duration (in days):   Central line:    JOSE: 9/13/2024    Discharge is dependent on: clinical     At this point Ms. Beasley is expected to be discharge to: home ?    The 21st Century Cures Act makes medical notes like these available to patients in the interest of transparency. Please be advised this is a medical document. Medical documents are intended to carry relevant information, facts as evident, and the clinical opinion of the practitioner. The medical note is intended as peer to peer communication and may appear blunt or direct. It is written in medical language and may contain abbreviations or verbiage that are unfamiliar.

## 2024-09-13 NOTE — PROGRESS NOTES
Progress Note  Samantha Beasley Patient Status:  Inpatient    1/3/1955 MRN [id number]   Location [facility] 2NE-A Attending Melodie Low MD   Hosp Day # 3 PCP No primary care provider on file.     Subjective:  Grandson at the bedside to interpret. No complaints of chest pain or SOB    Objective:  /74 (BP Location: Left arm)   Pulse 84   Temp 98.7 °F (37.1 °C) (Oral)   Resp 19   Wt 126 lb 15.8 oz (57.6 kg)   SpO2 92%     Telemetry: SR, 4.26 pause while asleep      Intake/Output: -    Intake/Output Summary (Last 24 hours) at 2024 1009  Last data filed at 2024 0410  Gross per 24 hour   Intake 200 ml   Output 950 ml   Net -750 ml       Last 3 Weights   24 0410 126 lb 15.8 oz (57.6 kg)   24 0300 134 lb 7.7 oz (61 kg)   24 0400 139 lb 12.4 oz (63.4 kg)   09/10/24 1944 160 lb (72.6 kg)   09/10/24 161 160 lb (72.6 kg)       Labs:  Recent Labs   Lab 09/10/24  1619 09/11/24  0453 24  0448   * 98  --    BUN 9 7*  --    CREATSERUM 0.84 0.65  --    EGFRCR 75 95  --    CA 9.0 9.6  --    ALB 3.6 3.7  --     139  --    K 3.8 3.7 4.1    106  --    CO2 25.0 25.0  --    ALKPHO 51* 57  --    AST 30 33  --    ALT 20 19  --    BILT 0.6 1.4*  --    TP 6.5 7.0  --      Recent Labs   Lab 09/10/24  1619 09/11/24  0453   RBC 3.96 4.09   HGB 11.2* 11.8*   HCT 36.2 36.1   MCV 91.4 88.3   MCH 28.3 28.9   MCHC 30.9* 32.7   RDW 13.2 13.2   NEPRELIM 3.88 6.88   WBC 9.3 8.9   .0 255.0         Recent Labs   Lab 09/10/24  1619   TROPHS 135*     No results found for: \"PT\", \"INR\"    Diagnostics:     Review of Systems   Constitutional: Negative.   Cardiovascular: Negative.    Respiratory: Negative.         Physical Exam:    Gen: Alert, oriented x 3, in no apparent distress  Heent: Pupils equal, reactive. Mucous membranes moist.   Cardiac: Regular rate and rhythm, normal S1,S2  Lungs: Clear to auscultation  Abd: Soft, non tender, non distended  Ext: No edema  Skin: Warm,  dry  Neuro: No focal deficits        Medications:     heparin  5,000 Units Subcutaneous 2 times per day    aspirin  81 mg Oral Daily    ticagrelor  90 mg Oral Q12H    atorvastatin  80 mg Oral Nightly             Assessment:  Acutej inferior STEMI s/p emergent PCI of 100% p RCA  On ASA, ticagrelor, statin   No BB due to 2nd degree HB  S/p C 9/13/24 with no patent stent RCA, no other significant lesions found. Non significant lesions of the LAD will be medically managed  Normal LVEF 55-60%  Second degree heart block types 1 and 2, transient  No BB  4.26 second pause last night while alseep  Hyperlipidemia, LDL 85 on statin   HTN - 107/54  Ascending aortic aneurysm 4.4 cm per CTA , aortic root normal sized per echo      Plan:  Cannot afford Brilinta, Will load with plavix 300 mg po and start 75 mg PO daily instead.   Continue statin and ASA  Will review telemetry with Dr. Mercado    Plan of care discussed with patient, RN.    PAULO Lafleur  9/13/2024  10:09 AM    Addendum: D/W Dr. Mercado. Will ask EP to review.   10:49 AM    ____________________________  Pt seen and examined and discussed with the APN.    Tele: NSR, normal MT  Pause happened during sleep. Sinus slowing with MT prolongation before pause.    Exam  General- awake alert  HEENT- PEERLA  Neck- JVP normal  CV- RRR  Lungs- CTA  Extremities- none  Neuro- Normal  Psych- mood/affect congruent  Skin- no lesions    I have personally performed the medical decision making in its entirety. My additions include:  Plan  - Pause has a classic vagal pattern and could also be from her IMI.  - Close monitoring, since most of these are self limited and rarely require a pacemaker.  - MCT in follow up to assess further  - Baseline narrow QRS and normal MT    R3    ____________________________  Jud Quigley MD, FACC, RS  Cardiac Electrophysiololgy  [facility]  9/13/2024

## 2024-09-14 VITALS
HEART RATE: 84 BPM | WEIGHT: 127 LBS | DIASTOLIC BLOOD PRESSURE: 76 MMHG | SYSTOLIC BLOOD PRESSURE: 115 MMHG | OXYGEN SATURATION: 92 % | TEMPERATURE: 98 F | RESPIRATION RATE: 20 BRPM

## 2024-09-14 RX ORDER — CLOPIDOGREL BISULFATE 75 MG/1
75 TABLET ORAL DAILY
Qty: 30 TABLET | Refills: 11 | Status: SHIPPED | OUTPATIENT
Start: 2024-09-14 | End: 2024-09-14

## 2024-09-14 RX ORDER — ASPIRIN 81 MG/1
81 TABLET, CHEWABLE ORAL DAILY
Qty: 30 TABLET | Refills: 11 | Status: SHIPPED | OUTPATIENT
Start: 2024-09-14

## 2024-09-14 RX ORDER — ASPIRIN 81 MG/1
81 TABLET, CHEWABLE ORAL DAILY
Qty: 30 TABLET | Refills: 11 | Status: SHIPPED | OUTPATIENT
Start: 2024-09-14 | End: 2024-09-14

## 2024-09-14 RX ORDER — CLOPIDOGREL BISULFATE 75 MG/1
75 TABLET ORAL DAILY
Qty: 30 TABLET | Refills: 11 | Status: SHIPPED | OUTPATIENT
Start: 2024-09-14

## 2024-09-14 RX ORDER — ATORVASTATIN CALCIUM 80 MG/1
80 TABLET, FILM COATED ORAL NIGHTLY
Qty: 90 TABLET | Refills: 0 | Status: SHIPPED | OUTPATIENT
Start: 2024-09-14

## 2024-09-14 NOTE — PLAN OF CARE
Received patient at 1930.  Alert and oriented x 4. Tele Rhythm NSR, oxygen maintained on room air. Breath sounds are clear. Skin -L groin post PCI has visible old bruising, soft, no pain. Last bowel movement 09/11. Patient voiding with no issues, output recorded. Patient reports no cardiac symptoms, No chest pain or shortness of breath. Bed is locked and in low position. Call light and personal items within reach. Pt up stand by. Reviewed plan of care with family member at bedside.     Problem: Patient/Family Goals  Goal: Patient/Family Long Term Goal  Description: Patient's Long Term Goal: Control BP    Interventions:  - Take meds as prescribed.   - [place]    Outcome: Progressing  Goal: Patient/Family Short Term Goal  Description: Patient's Short Term Goal: to get up and walk around    9/11 NOC \" able to sleep\"    Interventions:   - follow md poc  -[facility] care  - sleep aid kit    Outcome: Progressing     Problem: CARDIOVASCULAR - ADULT  Goal: Maintains optimal cardiac output and hemodynamic stability  Description: INTERVENTIONS:  - Monitor vital signs, rhythm, and trends  - Monitor for bleeding, hypotension and signs of decreased cardiac output  - Evaluate effectiveness of vasoactive medications to optimize hemodynamic stability  - Monitor arterial and/or venous puncture sites for bleeding and/or hematoma  - Assess quality of pulses, skin color and temperature  - Assess for signs of decreased coronary artery perfusion - ex. Angina  - Evaluate fluid balance, assess for edema, trend weights  Outcome: Progressing  Goal: Absence of cardiac arrhythmias or at baseline  Description: INTERVENTIONS:  - Continuous cardiac monitoring, monitor vital signs, obtain 12 lead EKG if indicated  - Evaluate effectiveness of antiarrhythmic and heart rate control medications as ordered  - Initiate emergency measures for life threatening arrhythmias  - Monitor electrolytes and administer replacement therapy as  ordered  Outcome: Progressing     Problem: METABOLIC/FLUID AND ELECTROLYTES - ADULT  Goal: Electrolytes maintained within normal limits  Description: INTERVENTIONS:  - Monitor labs and rhythm and assess patient for signs and symptoms of electrolyte imbalances  - Administer electrolyte replacement as ordered  - Monitor response to electrolyte replacements, including rhythm and repeat lab results as appropriate  - Fluid restriction as ordered  - Instruct patient on fluid and nutrition restrictions as appropriate  Outcome: Progressing     Problem: SKIN/TISSUE INTEGRITY - ADULT  Goal: Incision(s), wounds(s) or drain site(s) healing without S/S of infection  Description: INTERVENTIONS:  - Assess and document dressing/incision and surrounding tissue  Outcome: Progressing     Problem: Diabetes/Glucose Control  Goal: Glucose maintained within prescribed range  Description: INTERVENTIONS:  - Monitor Blood Glucose as ordered  - Assess for signs and symptoms of hyperglycemia and hypoglycemia  - Administer ordered medications to maintain glucose within target range  - Assess barriers to adequate nutritional intake and initiate nutrition consult as needed  - Instruct patient on self management of diabetes  Outcome: Progressing

## 2024-09-14 NOTE — CM/SW NOTE
Pt referred to [place] [employer] as initially listed as self pay. Per hospital account, pt has a [employer] insurance plan.    As requested, GHASSAN called pt family member, Chris. Answered questions on Medicaid eligibility/enrollment. Pt resides in [facility], GHASSAN listed [facility] and Medicaid application site on DC paperwork. RN made aware.     AUBREY Rachel

## 2024-09-14 NOTE — PROGRESS NOTES
Progress Note  Samantha Beasley Patient Status:  Inpatient    1/3/1955 MRN [id number]   Location [facility] 2NE-A Attending Melodie Low MD   Hosp Day # 4 PCP No primary care provider on file.     Subjective:  No new complaints of chest pain    Objective:  /70 (BP Location: Left arm)   Pulse 70   Temp 98.9 °F (37.2 °C) (Oral)   Resp 20   Wt 126 lb 15.8 oz (57.6 kg)   SpO2 94%     Telemetry: SR/ST      Intake/Output: -2694    Intake/Output Summary (Last 24 hours) at 2024 0653  Last data filed at 2024 0628  Gross per 24 hour   Intake 270 ml   Output 850 ml   Net -580 ml       Last 3 Weights   24 0410 126 lb 15.8 oz (57.6 kg)   24 0300 134 lb 7.7 oz (61 kg)   24 0400 139 lb 12.4 oz (63.4 kg)   09/10/24 194 160 lb (72.6 kg)   09/10/24 161 160 lb (72.6 kg)       Labs:  Recent Labs   Lab 09/10/24  1619 09/11/24  0453 24  0448   * 98  --    BUN 9 7*  --    CREATSERUM 0.84 0.65  --    EGFRCR 75 95  --    CA 9.0 9.6  --    ALB 3.6 3.7  --     139  --    K 3.8 3.7 4.1    106  --    CO2 25.0 25.0  --    ALKPHO 51* 57  --    AST 30 33  --    ALT 20 19  --    BILT 0.6 1.4*  --    TP 6.5 7.0  --      Recent Labs   Lab 09/10/24  1619 09/11/24  0453   RBC 3.96 4.09   HGB 11.2* 11.8*   HCT 36.2 36.1   MCV 91.4 88.3   MCH 28.3 28.9   MCHC 30.9* 32.7   RDW 13.2 13.2   NEPRELIM 3.88 6.88   WBC 9.3 8.9   .0 255.0         Recent Labs   Lab 09/10/24  1619   TROPHS 135*     No results found for: \"PT\", \"INR\"    Diagnostics:     Review of Systems   Constitutional: Positive for malaise/fatigue.   Cardiovascular: Negative.    Respiratory: Negative.         Physical Exam:    Gen: Alert, oriented x 3, in no apparent distress  Heent: Pupils equal, reactive. Mucous membranes moist.   Cardiac: Regular rate and rhythm, normal S1,S2  Lungs: Clear to auscultation  Abd: Soft, non tender, non distended  Ext: No edema  Skin: Warm, dry  Neuro: No focal deficits  Left groin  bruise      Medications:     clopidogrel  75 mg Oral Daily    heparin  5,000 Units Subcutaneous 2 times per day    aspirin  81 mg Oral Daily    atorvastatin  80 mg Oral Nightly             Assessment:  Acutej inferior STEMI s/p emergent PCI of 100% p RCA  On ASA, ticagrelor, statin   No BB due to 2nd degree HB  S/p LHC 9/13/24 with no patent stent RCA, no other significant lesions found. Non significant lesions of the LAD will be medically managed  Normal LVEF 55-60%  Second degree heart block types 1 and 2, transient - none further, now SR/ST  No BB  4.26 second pause 9/12 while alseep. Reviewed with EP, possibly vagal versus from IWMI. Plans for MCT at DC  Hyperlipidemia, LDL 85 on statin   HTN - 107/54  Ascending aortic aneurysm 4.4 cm per CTA , aortic root normal sized per echo    Plan:  Continue ASA, Plavix, statin  No BB due to transient heart block  MCT at discharge  Follow up Dr. Mercado. Ok to discharge to home from a cardiology standpoint.     Plan of care discussed with patient, RN.    PAULO Lafleur  9/14/2024  6:53 AM

## 2024-09-14 NOTE — PLAN OF CARE
Received pt at shift change. AxOx4. Room air. Denies pain/SOB. Vitals stable. NSR on tele.  See flowsheets for additional assessments.   Pt updated on POC. Call light within reach. All needs met at this time.     Plan:  -Discharge planning    Problem: Patient/Family Goals  Goal: Patient/Family Long Term Goal  Description: Patient's Long Term Goal: Control BP    Interventions:  - Take meds as prescribed.   - [place]    Outcome: Progressing  Goal: Patient/Family Short Term Goal  Description: Patient's Short Term Goal: to get up and walk around    9/11 NOC \" able to sleep\"    Interventions:   - follow md poc  -[facility] care  - sleep aid kit    Outcome: Progressing     Problem: CARDIOVASCULAR - ADULT  Goal: Maintains optimal cardiac output and hemodynamic stability  Description: INTERVENTIONS:  - Monitor vital signs, rhythm, and trends  - Monitor for bleeding, hypotension and signs of decreased cardiac output  - Evaluate effectiveness of vasoactive medications to optimize hemodynamic stability  - Monitor arterial and/or venous puncture sites for bleeding and/or hematoma  - Assess quality of pulses, skin color and temperature  - Assess for signs of decreased coronary artery perfusion - ex. Angina  - Evaluate fluid balance, assess for edema, trend weights  Outcome: Progressing  Goal: Absence of cardiac arrhythmias or at baseline  Description: INTERVENTIONS:  - Continuous cardiac monitoring, monitor vital signs, obtain 12 lead EKG if indicated  - Evaluate effectiveness of antiarrhythmic and heart rate control medications as ordered  - Initiate emergency measures for life threatening arrhythmias  - Monitor electrolytes and administer replacement therapy as ordered  Outcome: Progressing     Problem: METABOLIC/FLUID AND ELECTROLYTES - ADULT  Goal: Electrolytes maintained within normal limits  Description: INTERVENTIONS:  - Monitor labs and rhythm and assess patient for signs and symptoms of electrolyte  imbalances  - Administer electrolyte replacement as ordered  - Monitor response to electrolyte replacements, including rhythm and repeat lab results as appropriate  - Fluid restriction as ordered  - Instruct patient on fluid and nutrition restrictions as appropriate  Outcome: Progressing     Problem: SKIN/TISSUE INTEGRITY - ADULT  Goal: Incision(s), wounds(s) or drain site(s) healing without S/S of infection  Description: INTERVENTIONS:  - Assess and document dressing/incision and surrounding tissue  Outcome: Progressing     Problem: Diabetes/Glucose Control  Goal: Glucose maintained within prescribed range  Description: INTERVENTIONS:  - Monitor Blood Glucose as ordered  - Assess for signs and symptoms of hyperglycemia and hypoglycemia  - Administer ordered medications to maintain glucose within target range  - Assess barriers to adequate nutritional intake and initiate nutrition consult as needed  - Instruct patient on self management of diabetes  Outcome: Progressing

## 2024-09-16 ENCOUNTER — PATIENT OUTREACH (OUTPATIENT)
Dept: CASE MANAGEMENT | Age: 69
End: 2024-09-16

## 2024-09-16 ENCOUNTER — TELEPHONE (OUTPATIENT)
Dept: INTERNAL MEDICINE CLINIC | Facility: CLINIC | Age: 69
End: 2024-09-16

## 2024-09-16 DIAGNOSIS — Z02.9 ENCOUNTERS FOR ADMINISTRATIVE PURPOSES: Primary | ICD-10-CM

## 2024-09-16 NOTE — TELEPHONE ENCOUNTER
FYI-    Hospital Follow Up Appointment Scheduled For:  9/17/24    Discharge Date:  9/14/24    Hospital Discharged From:  [facility]

## 2024-09-16 NOTE — DISCHARGE SUMMARY
[facility]     Samantha Beasley Patient Status:  Inpatient    1/3/1955 MRN [id number]   Location [facility] 2NE-A Attending No att. providers found   Hosp Day # 4 PCP No primary care provider on file.     Date of Admission: 9/10/2024  Date of Discharge:  2024     Discharge Disposition: Home or Self Care    Discharge Diagnosis:  #STEMI  #Bradycardia  -Cardiology consulted from the emergency room  -CTA chest/abdomen/pelvis ordered to rule out dissection, negative study  -s/p LHC on 9/10 (GREGG to proximal RCA), severe proximal LAD - plan for LHC on 24  -A1c, lipid panel  -DAPT, bb, statin per cards  -echo     #Essential HTN  -unsure what meds pt on but she says \"I have bp issues\"     #Syncope  -2/2 above  -CT cervical spine, CT brain (9/10): Negative for acute process  -metabolic crisostomo    #Hyperglycemia  -no previous records, check A1c  -ISS for now    #Normocytic anemia      #Incidental aneurysmal dilatation of ascending thoracic aorta to diameter 4.4 cm  -cards following       History of Present Illness:     Samantha Beasley is a 69 year old female with PMHx Htn presents via EMS for concerns of seizure-like activity.  On arrival, pt had concerns for stemi on EKG. Pt reportedly lucid but is not her self right now. In ER, pt was evaluaed by cards who initially wanted to rule out dissection / ct brain to r/o intracranial process; crisostomo negative. Pt taken to cath lab emergently. Pt seen s/p LHC w/ GREGG. At the bedside in the CNICU I spoke to patient's grandson. 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 There was no seizure like activity reported at this time. Pt is currently lucid and at her baseline mentation per grandson at the bedside.     Brief Synopsis:     Pt was admitted cards consulted and pt diagnosed with STEMI and she underwent LHC with GREGG to proximal RCA. PT was DC home in stable condition    Lace+  Score: 41  59-90 High Risk  29-58 Medium Risk  0-28   Low Risk  Patient was referred to the [facility].    TCM Follow-Up Recommendation:  LACE > 58: High Risk of readmission after discharge from the hospital.      Procedures during hospitalization:   [facility]     Incidental or significant findings and recommendations (brief descriptions):   #Incidental aneurysmal dilatation of ascending thoracic aorta to diameter 4.4 cm      Lab/Test results pending at Discharge:   no    Consultants:  Cards    Discharge Medication List:     Discharge Medications        START taking these medications        Instructions Prescription details   aspirin 81 MG Chew      Chew 1 tablet (81 mg total) by mouth daily.   Quantity: 30 tablet  Refills: 11     atorvastatin 80 MG Tabs  Commonly known as: Lipitor      Take 1 tablet (80 mg total) by mouth nightly.   Quantity: 90 tablet  Refills: 0     clopidogrel 75 MG Tabs  Commonly known as: Plavix      Take 1 tablet (75 mg total) by mouth daily.   Quantity: 30 tablet  Refills: 11            STOP taking these medications      amLODIPine 5 MG Tabs  Commonly known as: Norvasc        atenolol 50 MG Tabs  Commonly known as: Tenormin                  Where to Get Your Medications        These medications were sent to [id number] - [place] [id number] [facility] [address], [phone], [phone]  [facility] [address]      Phone: [phone]   aspirin 81 MG Chew  atorvastatin 80 MG Tabs  clopidogrel 75 MG Tabs         [place]: [place]     Follow-up appointment:   [facility]  [facility] Alexis [address] [place] [id number] [phone]  Schedule an appointment as soon as possible for a visit      Primary Care Doctor    Schedule an appointment as soon as possible for a visit in 1 week(s)  To establish with an [facility] doctor, call our Physician Referral line at [phone], Monday through Friday  from 7 a.m. to 6 p.m. and  from 7 a.m. to 1 p.m    Marcial Mercado MD  [address] [place] [id number]  [phone]    Follow up  our office will call you for an appointment    Appointments for Next 30 Days 2024 - 10/16/2024      None            Vital signs:       Physical Exam:    General: No acute distress   Lungs: clear to auscultation  Cardiovascular: S1, S2  Abdomen: Soft      -----------------------------------------------------------------------------------------------  PATIENT DISCHARGE INSTRUCTIONS: See electronic chart    Melodie Low MD    Total time spent on discharge plannin minutes     The  Century Cures Act makes medical notes like these available to patients in the interest of transparency. Please be advised this is a medical document. Medical documents are intended to carry relevant information, facts as evident, and the clinical opinion of the practitioner. The medical note is intended as peer to peer communication and may appear blunt or direct. It is written in medical language and may contain abbreviations or verbiage that are unfamiliar.

## 2024-09-16 NOTE — TELEPHONE ENCOUNTER
Future Appointments   Date Time Provider Department Center   9/17/2024  7:00 AM Heidy Riggs APRN [facility]     Records available in [facility].

## 2024-09-17 ENCOUNTER — OFFICE VISIT (OUTPATIENT)
Dept: INTERNAL MEDICINE CLINIC | Facility: CLINIC | Age: 69
End: 2024-09-17
Payer: COMMERCIAL

## 2024-09-17 VITALS
SYSTOLIC BLOOD PRESSURE: 118 MMHG | HEART RATE: 71 BPM | BODY MASS INDEX: 29.51 KG/M2 | TEMPERATURE: 98 F | WEIGHT: 127.5 LBS | HEIGHT: 55 IN | DIASTOLIC BLOOD PRESSURE: 74 MMHG | RESPIRATION RATE: 16 BRPM

## 2024-09-17 DIAGNOSIS — Z09 HOSPITAL DISCHARGE FOLLOW-UP: Primary | ICD-10-CM

## 2024-09-17 DIAGNOSIS — I10 PRIMARY HYPERTENSION: ICD-10-CM

## 2024-09-17 DIAGNOSIS — I25.10 CORONARY ARTERY DISEASE INVOLVING NATIVE CORONARY ARTERY OF NATIVE HEART WITHOUT ANGINA PECTORIS: ICD-10-CM

## 2024-09-17 DIAGNOSIS — I21.19 ST ELEVATION MYOCARDIAL INFARCTION (STEMI) INVOLVING OTHER CORONARY ARTERY OF INFERIOR WALL (HCC): ICD-10-CM

## 2024-09-17 DIAGNOSIS — E55.9 VITAMIN D DEFICIENCY: ICD-10-CM

## 2024-09-17 DIAGNOSIS — R79.89 ELEVATED TSH: ICD-10-CM

## 2024-09-17 DIAGNOSIS — R73.03 PRE-DIABETES: ICD-10-CM

## 2024-09-17 DIAGNOSIS — Z00.00 ENCOUNTER FOR ANNUAL PHYSICAL EXAM: ICD-10-CM

## 2024-09-17 DIAGNOSIS — D64.9 ANEMIA, UNSPECIFIED TYPE: ICD-10-CM

## 2024-09-17 DIAGNOSIS — E53.8 VITAMIN B12 DEFICIENCY: ICD-10-CM

## 2024-09-17 DIAGNOSIS — I77.810 ASCENDING AORTA DILATION (HCC): ICD-10-CM

## 2024-09-17 PROCEDURE — 99496 TRANSJ CARE MGMT HIGH F2F 7D: CPT | Performed by: NURSE PRACTITIONER

## 2024-09-17 RX ORDER — ERGOCALCIFEROL 1.25 MG/1
50000 CAPSULE, LIQUID FILLED ORAL WEEKLY
Qty: 8 CAPSULE | Refills: 0 | Status: SHIPPED | OUTPATIENT
Start: 2024-09-17

## 2024-09-17 RX ORDER — MELATONIN
1000 DAILY
COMMUNITY
Start: 2024-09-17

## 2024-09-17 NOTE — PATIENT INSTRUCTIONS
See the cardiologist as planned.    Start vitamin D3 50,000 iu once weekly x 8 weeks then switch to over the counter vitamin D3 1,000 iu daily.    Start over the counter vitamin B12 1,000 mcg daily.    Get your labs done in 3 months at [facility]. You should be fasting for at least 10 hours (you can drink water during fasting). If you take a multivitamin with Biotin or any biotin product it should be held for 3 days prior to getting your labs done.

## 2024-09-17 NOTE — PROGRESS NOTES
Subjective:   Samantha Beasley is a 69 year old female new to the office accompanied by her grandson, Chris who presents for hospital follow up. She would like her grandson to interpret for her. Declines interpretor services.   She was discharged from [facility] to Home or Self Care  Admission Date: 9/10/24   Discharge Date: 9/14/24  Hospital Discharge Diagnosis:   #STEMI  #Bradycardia  #Essential HTN  #Syncope  #Hyperglycemia  #Normocytic anemia   #Incidental aneurysmal dilatation of ascending thoracic aorta to diameter 4.4 cm    Interactive contact within 2 business days post discharge first initiated on Date: 9/16/2024    During the visit, the following was completed:  Obtained and reviewed discharge summary, continuity of care documents, Hospitalist notes, and Cardiology notes  Reviewed Labs (CBC, CMP), Labs (Cardiac markers), CT radiology results, X-Ray radiology results, EKG, Echocardiogram, and Operative reports: Cardiac    HPI per hospital discharge note:   Samantha Beasley is a 69 year old female with PMHx Htn presents via EMS for concerns of seizure-like activity.  On arrival, pt had concerns for stemi on EKG. Pt reportedly lucid but is not her self right now. In ER, pt was evaluaed by cards who initially wanted to rule out dissection / ct brain to r/o intracranial process; crisostomo negative. Pt taken to cath lab emergently. Pt seen s/p LHC w/ GREGG. At the bedside in the CNICU I spoke to patient's grandson. 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 There was no seizure like activity reported at this time. Pt is currently lucid and at her baseline mentation per grandson at the bedside.      Brief Synopsis:   Pt was admitted cards consulted and pt diagnosed with STEMI and she underwent LHC with GREGG to proximal RCA. PT was DC home in stable condition    HPI: Patient moved from [place] 2 years ago. Has not seen a PCP here. She was taking  amlodipine for HTN in the past but not since discharge. Her BP has been stable. Denies any other PMHx other than during her hospital stay.     She is  and has 2 adult children and 6 grandchildren. No alcohol use or smoking.     She has an appointment with cardiology on 10/9/24. She denies any chest pain, shortness of breath, palpitations, lightheadedness, or syncope. She has been compliant in taking all of her medications.     Labs discussed with patient and grandson in detail. Has mild anemia, low b12 (she is a vegetarian), low vitamin D, prediabetes, and elevated TSH. Denies any symptoms of hypothyroidism such as constipation, weight gain, or fatigue.     History/Other:   Current Medications:  Medication Reconciliation:  I am aware of an inpatient discharge within the last 30 days.  The discharge medication list has been reconciled with the patient's current medication list and reviewed by me.  See medication list for additions of new medication, and changes to current doses of medications and discontinued medications.  Outpatient Medications Marked as Taking for the 9/17/24 encounter (Office Visit) with Heidy Rigsg APRN   Medication Sig    ergocalciferol 1.25 MG (90825 UT) Oral Cap Take 1 capsule (50,000 Units total) by mouth once a week.    cyanocobalamin 1000 MCG Oral Tab Take 1 tablet (1,000 mcg total) by mouth daily.    aspirin 81 MG Oral Chew Tab Chew 1 tablet (81 mg total) by mouth daily.    atorvastatin 80 MG Oral Tab Take 1 tablet (80 mg total) by mouth nightly.    clopidogrel 75 MG Oral Tab Take 1 tablet (75 mg total) by mouth daily.       Review of Systems:  GENERAL: weight stable, energy stable, no sweating  SKIN: denies any unusual skin lesions  EYES: denies blurred vision or double vision  HEENT: denies nasal congestion, sinus pain or ST  LUNGS: denies shortness of breath with exertion  CARDIOVASCULAR: denies chest pain on exertion or palpitations  GI: denies abdominal pain, denies  heartburn, denies diarrhea  MUSCULOSKELETAL: denies pain, normal range of motion of extremities  NEURO: denies headaches, denies dizziness, denies weakness  PSYCHE: denies depression or anxiety  HEMATOLOGIC: hx of anemia+, denies bruising, denies bleeding  ENDOCRINE: denies thyroid history  ALL/ASTHMA: denies hx of allergy or asthma    Objective:   No LMP recorded. Patient is postmenopausal.  Estimated body mass index is 30.68 kg/m² as calculated from the following:    Height as of this encounter: 4' 6.06\" (1.373 m).    Weight as of this encounter: 127 lb 8 oz (57.8 kg).   /74 (BP Location: Left arm, Patient Position: Sitting, Cuff Size: adult)   Pulse 71   Temp 97.9 °F (36.6 °C) (Temporal)   Resp 16   Ht 4' 6.06\" (1.373 m)   Wt 127 lb 8 oz (57.8 kg)   BMI 30.68 kg/m²    GENERAL: well developed, well nourished, in no apparent distress  SKIN: no rashes, no suspicious lesions  HEENT: atraumatic, normocephalic  EYES: PERRLA, EOMI, conjunctiva are clear  NECK: supple, no adenopathy, no bruits  CHEST: no chest tenderness  LUNGS: clear to auscultation  CARDIO: RRR without murmur  GI: good BS's, no masses, HSM or tenderness  MUSCULOSKELETAL: back is not tender, FROM of the extremities  EXTREMITIES: no cyanosis, clubbing or edema  NEURO: Oriented times three, cranial nerves are intact, motor and sensory are grossly intact    Assessment & Plan:   1. Hospital discharge follow-up (Primary)  2. ST elevation myocardial infarction (STEMI) involving other coronary artery of inferior wall (HCC)  3. Coronary artery disease involving native coronary artery of native heart without angina pectoris  -See cardiology as planned  -Continue statin, plavix, and aspirin    4. Primary hypertension  -Not on any meds. BP stable off meds. Continue to monitor    5. Ascending aorta dilation (HCC)  -See cardiology     6. Anemia, unspecified type  -Denies any blood in stool, fatigue, or weakness   -Check iron studies with labs in 3  months  -     Iron And Tibc; Future; Expected date: 12/17/2024  -     Ferritin; Future; Expected date: 12/17/2024  -  7. Pre-diabetes  -Low carb diet and slowly increase exercise as tolerated under cardiology's instructions  -     Comp Metabolic Panel (14); Future; Expected date: 12/17/2024    8. Vitamin B12 deficiency  -Start OTC vitamin b12 1000 mcg daily  -Repeat vitamin b12 in 3 months  -     Vitamin B12; Future; Expected date: 12/17/2024    9. Vitamin D deficiency  -Start Vitamin D3 50,000 iu weekly x 8 weeks then switch to OTC vitamin D3 1,000 iu daily  -Repeat vitamin D in 3 months  -     Vitamin D, 25-Hydroxy; Future; Expected date: 12/17/2024  -     Vitamin D (Ergocalciferol); Take 1 capsule (50,000 Units total) by mouth once a week.  Dispense: 8 capsule; Refill: 0    10. Elevated TSH  -Asymptomatic. Continue to monitor  -Repeat TSH in 3 months  -     TSH W Reflex To Free T4; Future; Expected date: 12/17/2024    11. Encounter for annual physical exam  -     Comp Metabolic Panel (14); Future; Expected date: 12/17/2024  -     TSH W Reflex To Free T4; Future; Expected date: 12/17/2024      Return in about 1 month (around 10/17/2024) for physical.

## 2024-09-25 LAB — ISTAT ACTIVATED CLOTTING TIME: 299 SECONDS (ref 74–137)

## 2024-10-23 ENCOUNTER — OFFICE VISIT (OUTPATIENT)
Dept: INTERNAL MEDICINE CLINIC | Facility: CLINIC | Age: 69
End: 2024-10-23
Payer: COMMERCIAL

## 2024-10-23 VITALS
RESPIRATION RATE: 16 BRPM | TEMPERATURE: 98 F | HEIGHT: 55 IN | WEIGHT: 129.38 LBS | HEART RATE: 80 BPM | OXYGEN SATURATION: 98 % | BODY MASS INDEX: 29.94 KG/M2 | SYSTOLIC BLOOD PRESSURE: 120 MMHG | DIASTOLIC BLOOD PRESSURE: 74 MMHG

## 2024-10-23 DIAGNOSIS — D64.9 ANEMIA, UNSPECIFIED TYPE: ICD-10-CM

## 2024-10-23 DIAGNOSIS — Z12.31 ENCOUNTER FOR SCREENING MAMMOGRAM FOR BREAST CANCER: ICD-10-CM

## 2024-10-23 DIAGNOSIS — E53.8 VITAMIN B12 DEFICIENCY: ICD-10-CM

## 2024-10-23 DIAGNOSIS — I10 PRIMARY HYPERTENSION: ICD-10-CM

## 2024-10-23 DIAGNOSIS — R73.03 PRE-DIABETES: ICD-10-CM

## 2024-10-23 DIAGNOSIS — Z00.00 ENCOUNTER FOR ANNUAL PHYSICAL EXAM: Primary | ICD-10-CM

## 2024-10-23 DIAGNOSIS — I25.10 CORONARY ARTERY DISEASE INVOLVING NATIVE CORONARY ARTERY OF NATIVE HEART WITHOUT ANGINA PECTORIS: ICD-10-CM

## 2024-10-23 DIAGNOSIS — Z12.11 ENCOUNTER FOR SCREENING FOR MALIGNANT NEOPLASM OF COLON: ICD-10-CM

## 2024-10-23 DIAGNOSIS — I21.3 ST ELEVATION MYOCARDIAL INFARCTION (STEMI), UNSPECIFIED ARTERY (HCC): ICD-10-CM

## 2024-10-23 DIAGNOSIS — Z23 NEED FOR VACCINATION: ICD-10-CM

## 2024-10-23 DIAGNOSIS — Z13.820 SCREENING FOR OSTEOPOROSIS: ICD-10-CM

## 2024-10-23 DIAGNOSIS — R79.89 ELEVATED TSH: ICD-10-CM

## 2024-10-23 DIAGNOSIS — Z78.0 POSTMENOPAUSAL: ICD-10-CM

## 2024-10-23 DIAGNOSIS — I77.810 ASCENDING AORTA DILATION (HCC): ICD-10-CM

## 2024-10-23 DIAGNOSIS — E55.9 VITAMIN D DEFICIENCY: ICD-10-CM

## 2024-10-23 PROCEDURE — 90471 IMMUNIZATION ADMIN: CPT | Performed by: NURSE PRACTITIONER

## 2024-10-23 PROCEDURE — 90677 PCV20 VACCINE IM: CPT | Performed by: NURSE PRACTITIONER

## 2024-10-23 PROCEDURE — 90472 IMMUNIZATION ADMIN EACH ADD: CPT | Performed by: NURSE PRACTITIONER

## 2024-10-23 PROCEDURE — 90662 IIV NO PRSV INCREASED AG IM: CPT | Performed by: NURSE PRACTITIONER

## 2024-10-23 PROCEDURE — 99397 PER PM REEVAL EST PAT 65+ YR: CPT | Performed by: NURSE PRACTITIONER

## 2024-10-23 NOTE — PATIENT INSTRUCTIONS
Get your labs done in December at [facility]. You should be fasting for at least 10 hours (you can drink water during fasting). If you take a multivitamin with Biotin or any biotin product it should be held for 3 days prior to getting your labs done.     Get the mammogram and DEXA (bone density) scan done.    See the stomach doctor and get colonoscopy done.    Get the tetanus (tdap) and shingles vaccine at pharmacy or come back with nurse visit in our office.     Continue to see the cardiologist.

## 2024-11-14 ENCOUNTER — PATIENT MESSAGE (OUTPATIENT)
Dept: INTERNAL MEDICINE CLINIC | Facility: CLINIC | Age: 69
End: 2024-11-14

## 2024-11-16 ENCOUNTER — PATIENT MESSAGE (OUTPATIENT)
Dept: INTERNAL MEDICINE CLINIC | Facility: CLINIC | Age: 69
End: 2024-11-16

## 2024-11-18 NOTE — TELEPHONE ENCOUNTER
OV note 10/23/24   5. Primary hypertension  -Not on any meds. BP stable off meds. Continue to monitor

## 2024-11-18 NOTE — TELEPHONE ENCOUNTER
Please advise     OV note from 10/23/24:   5. Primary hypertension  -Not on any meds. BP stable off meds. Continue to monitor

## 2024-11-18 NOTE — TELEPHONE ENCOUNTER
The last two times I have seen her, she was not on these two medicines. Can we confirm with patient whether she has been taking it since hospital discharge and what her BP has been with or without medications. Thanks.

## 2024-11-19 NOTE — TELEPHONE ENCOUNTER
See patient message. Recs to come from cards? Thanks! I see notes in CE from robbie Mercado. Unsure if these were prescribed in hospital or not.

## 2024-11-19 NOTE — TELEPHONE ENCOUNTER
Response from provider from 11/14/24 encounter:  \"The last two times I have seen her, she was not on these two medicines. Can we confirm with patient whether she has been taking it since hospital discharge and what her BP has been with or without medications. Thanks. \"    Mcm sent.

## 2024-11-20 NOTE — TELEPHONE ENCOUNTER
I'm confused. Did she establish with new PCP?     In regards to BP, if her BP has been fine without both medications since hospital discharge, she can stay off of it as long her BP is normal.     If she has established with new PCP,  please have her reach out to him for further questions and update PCP on our end. Thanks.

## 2024-11-21 ENCOUNTER — PATIENT OUTREACH (OUTPATIENT)
Dept: CASE MANAGEMENT | Age: 69
End: 2024-11-21

## 2024-11-21 NOTE — PROCEDURES
The office order for PCP removal request is Approved and finalized on November 21, 2024.    Removed Crystal Lopez MD as the patient's Primary Care Physician

## 2024-12-17 ENCOUNTER — PATIENT MESSAGE (OUTPATIENT)
Dept: INTERNAL MEDICINE CLINIC | Facility: CLINIC | Age: 69
End: 2024-12-17

## 2024-12-17 DIAGNOSIS — E55.9 VITAMIN D DEFICIENCY: ICD-10-CM

## 2024-12-20 RX ORDER — ASPIRIN 81 MG/1
81 TABLET, CHEWABLE ORAL DAILY
Qty: 30 TABLET | Refills: 11
Start: 2024-12-20

## 2024-12-20 RX ORDER — CLOPIDOGREL BISULFATE 75 MG/1
75 TABLET ORAL DAILY
Qty: 30 TABLET | Refills: 11
Start: 2024-12-20

## 2024-12-20 RX ORDER — ATORVASTATIN CALCIUM 80 MG/1
80 TABLET, FILM COATED ORAL NIGHTLY
Qty: 90 TABLET | Refills: 0
Start: 2024-12-20

## 2024-12-20 RX ORDER — ERGOCALCIFEROL 1.25 MG/1
50000 CAPSULE, LIQUID FILLED ORAL WEEKLY
Qty: 8 CAPSULE | Refills: 0 | OUTPATIENT
Start: 2024-12-20

## 2024-12-20 NOTE — TELEPHONE ENCOUNTER
9. Vitamin D deficiency  -Start Vitamin D3 50,000 iu weekly x 8 weeks then switch to OTC vitamin D3 1,000 iu daily  -Repeat vitamin D in 3 months     Message sent with this info and advised to get labs drawn.

## 2025-06-24 NOTE — PROGRESS NOTES
CHIEF COMPLAINT     Chief Complaint   Patient presents with    Physical     FOZIA: N/A, Colon: N/A    Immunization/Injection     Flu Shot     HPI:   Samantha Beasley is a 69 year old female who presents for a complete physical exam.     Diet is fair. Exercise is walking occasionally, nothing regular. Alcohol use is none. No smoking. She is , has 2 daughters, 1 son, and 6 grandchildren. She lives with her daughter.     Labs ordered, due in December. Vaccines reviewed, no records available. Due for tetanus, shingles, pneumonia, flu, and covid vaccines. Mammogram is due, never done per patient. Reinforced self breast exams. Pap not indicated, she is s/p TAHBSO. DEXA is due. Colonoscopy never done. Wears seatbelt. No texting and driving. No skin concerns.    History of recent STEMI s/p PCI to RCA. Seeing cardiology, on statin, aspirin, and plavix. Denies any chest pain or shortness of breath. She will be starting cardiac rehab.     Wt Readings from Last 6 Encounters:   10/23/24 129 lb 6.4 oz (58.7 kg)   09/17/24 127 lb 8 oz (57.8 kg)   09/13/24 126 lb 15.8 oz (57.6 kg)     Body mass index is 31.2 kg/m².     Cholesterol, Total (mg/dL)   Date Value   09/10/2024 156     HDL Cholesterol (mg/dL)   Date Value   09/10/2024 49     LDL Cholesterol (mg/dL)   Date Value   09/10/2024 85     AST (U/L)   Date Value   09/11/2024 33   09/10/2024 30     ALT (U/L)   Date Value   09/11/2024 19   09/10/2024 20        Current Outpatient Medications   Medication Sig Dispense Refill    ergocalciferol 1.25 MG (94923 UT) Oral Cap Take 1 capsule (50,000 Units total) by mouth once a week. 8 capsule 0    cyanocobalamin 1000 MCG Oral Tab Take 1 tablet (1,000 mcg total) by mouth daily.      aspirin 81 MG Oral Chew Tab Chew 1 tablet (81 mg total) by mouth daily. 30 tablet 11    atorvastatin 80 MG Oral Tab Take 1 tablet (80 mg total) by mouth nightly. 90 tablet 0    clopidogrel 75 MG Oral Tab Take 1 tablet (75 mg total) by mouth daily. 30  tablet 11      Allergies[1]   Past Medical History:    Atherosclerosis of coronary artery    Heart attack on that day    Calculus of kidney    Essential hypertension    High blood pressure    Hyperlipidemia    I had heart attack in that day    Myocardial infarction (HCC)    Heart attack on that day      Past Surgical History:   Procedure Laterality Date    Angioplasty (coronary)  09/10/2024    Heart attack    Cataract Bilateral     Fracture surgery      Right wrist surgery    Hysterectomy            x 3      Family History   Problem Relation Age of Onset    No Known Problems Mother     No Known Problems Father     Breast Cancer Sister       Social History:   Social History     Socioeconomic History    Marital status:    Tobacco Use    Smoking status: Never     Passive exposure: Never    Smokeless tobacco: Never   Vaping Use    Vaping status: Never Used   Substance and Sexual Activity    Alcohol use: Never    Drug use: Never    Sexual activity: Not Currently   Other Topics Concern    Caffeine Concern No    Exercise No    Seat Belt No    Special Diet No    Stress Concern No    Weight Concern No     Social Drivers of Health     Food Insecurity: No Food Insecurity (9/10/2024)    Food Insecurity     Food Insecurity: Never true   Transportation Needs: No Transportation Needs (9/10/2024)    Transportation Needs     Lack of Transportation: No   Housing Stability: Low Risk  (9/10/2024)    Housing Stability     Housing Instability: No        REVIEW OF SYSTEMS:   GENERAL: feels well otherwise  SKIN: denies any unusual skin lesions  EYES: denies blurred vision or double vision  HEENT: denies nasal congestion, sinus pain or ST  LUNGS: denies shortness of breath with exertion  CARDIOVASCULAR: denies chest pain on exertion  GI: denies abdominal pain, denies heartburn  : denies vaginal discharge or dysuria  MUSCULOSKELETAL: denies back pain  NEURO: denies headaches  PSYCH: denies depression or anxiety  HEMATOLOGIC: hx  of anemia+  ENDOCRINE: denies thyroid history  ALL/ASTHMA: denies hx of allergy or asthma    EXAM:   /74 (BP Location: Left arm, Patient Position: Sitting, Cuff Size: adult)   Pulse 80   Temp 97.9 °F (36.6 °C) (Temporal)   Resp 16   Ht 4' 6\" (1.372 m)   Wt 129 lb 6.4 oz (58.7 kg)   SpO2 98%   BMI 31.20 kg/m²   Body mass index is 31.2 kg/m².   GENERAL: well developed, well nourished, in no apparent distress  SKIN: no rashes, no suspicious lesions  HEENT: atraumatic, normocephalic, ears are clear  EYES: PERRLA, EOMI, conjunctiva are clear  NECK: supple, no adenopathy, no bruits, no thyroid masses  BREAST: no dominant or suspicious mass, no nipple d/c, inversion or drainage, no axillary LA  LUNGS: clear to auscultation, no rhonchi, rales, or wheezing  CARDIO: RRR without murmur  GI: good BS's, no masses, HSM or tenderness  MUSCULOSKELETAL: No obvious joint deformity or swelling. Normal gait  EXTREMITIES: no cyanosis, clubbing or edema  NEURO: oriented times three, cranial nerves are grossly intact, motor and sensory are grossly intact    LABS:     Lab Results   Component Value Date    WBC 8.9 09/11/2024    RBC 4.09 09/11/2024    HGB 11.8 (L) 09/11/2024    HCT 36.1 09/11/2024    MCV 88.3 09/11/2024    MCH 28.9 09/11/2024    MCHC 32.7 09/11/2024    RDW 13.2 09/11/2024    .0 09/11/2024      Lab Results   Component Value Date    GLU 98 09/11/2024    BUN 7 (L) 09/11/2024    CREATSERUM 0.65 09/11/2024    ANIONGAP 8 09/11/2024    CA 9.6 09/11/2024    OSMOCALC 286 09/11/2024    ALKPHO 57 09/11/2024    AST 33 09/11/2024    ALT 19 09/11/2024    BILT 1.4 (H) 09/11/2024    TP 7.0 09/11/2024    ALB 3.7 09/11/2024    GLOBULIN 3.3 09/11/2024     09/11/2024    K 4.1 09/12/2024     09/11/2024    CO2 25.0 09/11/2024      Lab Results   Component Value Date    CHOLEST 156 09/10/2024    TRIG 124 09/10/2024    HDL 49 09/10/2024    LDL 85 09/10/2024    VLDL 20 09/10/2024    NONHDLC 107 09/10/2024      Lab  Results   Component Value Date    T4F 1.2 09/10/2024    TSH 7.222 (H) 09/10/2024      Lab Results   Component Value Date     (H) 09/11/2024    A1C 6.2 (H) 09/11/2024       IMAGING:     No results found.     ASSESSMENT AND PLAN:     1. Encounter for annual physical exam  Samantha Beasley is a 69 year old female who presents for a complete physical exam. Reviewed diet and exercise. Pt' s weight is Body mass index is 31.2 kg/m².. Recommended regular exercise. The patient indicates understanding of these issues and agrees to the plan  -Do labs in December  -Due for mammogram, ordered. Breast exam done  -Pap not indicated, she is s/p TAHBSO  -Due for colonoscopy, GI referral placed  -Do DEXA scan  -Flu and prevnar 20 to be given today  -Advised to get tdap and shingles with nurse visit at next visit or at pharmacy  -Declines covid vaccines    2. ST elevation myocardial infarction (STEMI), unspecified artery (HCC)  -Continue management per cardiology    3. Coronary artery disease involving native coronary artery of native heart without angina pectoris  -Continue statin, plavix, and aspirin  -Continue to see cardiology    4. Ascending aorta dilation (HCC)  -Followed by cardiology    5. Primary hypertension  -Not on any meds. BP stable off meds. Continue to monitor    6. Anemia, unspecified type  -Do iron studies as ordered in December    7. Pre-diabetes  -Low carb diet and regular exercise    8. Vitamin B12 deficiency  -Continue b12 supplements  -Repeat b12 lab in December 9. Vitamin D deficiency  -Continue vitamin D  -Repeat vitamin D in December    10. Elevated TSH  -Asymptomatic  -Repeat TSH in December    11. Encounter for screening mammogram for breast cancer  - [facility] [employer] 2D+3D SCREENING BILAT (CPT=77067/55574); Future    12. Encounter for screening for malignant neoplasm of colon  - EVALUATE & TREAT, GASTRO (INTERNAL)    13. Postmenopausal  - XR DEXA BONE DENSITOMETRY (CPT=77080); Future    14.  Screening for osteoporosis  - XR DEXA BONE DENSITOMETRY (CPT=77080); Future    15. Need for vaccination  - Prevnar 20 (PCV20) [35933]  - INFLUENZA VAC HIGH DOSE PRSV FREE     Return in about 1 year (around 10/23/2025) for physical.    Heidy Riggs, RAMAN  10/23/2024         [1] No Known Allergies     N/A

## (undated) NOTE — LETTER
:  1/3/1955      To Whom It May Concern:    This patient was seen in our office on 24 .  Work status:  May return to work full-time, no restrictions    May return to work status per above effective 24.    If this office may be of further assistance, please do not hesitate to contact us.      Sincerely,        Melodie Low MD

## (undated) NOTE — LETTER
[address]  [address]  Consent for Procedure/Sedation  Date: ***         Time: ***    I hereby authorize Marcial Mercado , my physician and his/her assistants (if applicable), which may include medical students, residents, and/or fellows, to perform the following surgical operation/ procedure and administer such anesthesia as may be determined necessary by my physician:  Operation/Procedure name (s)  Cardiac Catheterization, Left Ventricular Cineangiography, Bilateral Selective Coronary Angiography and/or Right Heart Catheterization; possible Percutaneous Transluminal Coronary Angioplasty, Coronary Atherectomy, Coronary Stent, Intracoronary Thrombolytic therapy, Antiplatelet therapy and/or Intravascular Ultrasound on Samantha Woodel   2.   I recognize that during the surgical operation/procedure, unforeseen conditions may necessitate additional or different procedures than those listed above.  I, therefore, further authorize and request that the above-named surgeon, assistants, or designees perform such procedures as are, in their judgment, necessary and desirable.    3.   My surgeon/physician has discussed prior to my surgery the potential benefits, risks and side effects of this procedure; the likelihood of achieving goals; and potential problems that might occur during recuperation.  They also discussed reasonable alternatives to the procedure, including risks, benefits, and side effects related to the alternatives and risks related to not receiving this procedure.  I have had all my questions answered and I acknowledge that no guarantee has been made as to the result that may be obtained.    4.   Should the need arise during my operation/procedure, which includes change of level of care prior to discharge, I also consent to the administration of blood and/or blood products.  Further, I understand that despite careful testing and screening of blood or blood products by collecting  agencies, I may still be subject to ill effects as a result of receiving a blood transfusion and/or blood products.  The following are some, but not all, of the potential risks that can occur: fever and allergic reactions, hemolytic reactions, transmission of diseases such as Hepatitis, AIDS and Cytomegalovirus (CMV) and fluid overload.  In the event that I wish to have an autologous transfusion of my own blood, or a directed donor transfusion, I will discuss this with my physician.  Check only if Refusing Blood or Blood Products  I understand refusal of blood or blood products as deemed necessary by my physician may have serious consequences to my condition to include possible death. I hereby assume responsibility for my refusal and release the hospital, its personnel, and my physicians from any responsibility for the consequences of my refusal.          o  Refuse      5.   I authorize the use of any specimen, organs, tissues, body parts or foreign objects that may be removed from my body during the operation/procedure for diagnosis, research or teaching purposes and their subsequent disposal by hospital authorities.  I also authorize the release of specimen test results and/or written reports to my treating physician on the hospital medical staff or other referring or consulting physicians involved in my care, at the discretion of the Pathologist or my treating physician.    6.   I consent to the photographing or videotaping of the operations or procedures to be performed, including appropriate portions of my body for medical, scientific, or educational purposes, provided my identity is not revealed by the pictures or by descriptive texts accompanying them.  If the procedure has been photographed/videotaped, the surgeon will obtain the original picture, image, videotape or CD.  The hospital will not be responsible for storage, release or maintenance of the picture, image, tape or CD.    7.   I consent to the  presence of a  or observers in the operating room as deemed necessary by my physician or their designees.    8.   I recognize that in the event my procedure results in extended X-Ray/fluoroscopy time, I may develop a skin reaction.    9. If I have a Do Not Attempt Resuscitation (DNAR) order in place, that status will be suspended while in the operating room, procedural suite, and during the recovery period unless otherwise explicitly stated by me (or a person authorized to consent on my behalf). The surgeon or my attending physician will determine when the applicable recovery period ends for purposes of reinstating the DNAR order.  10. Patients having a sterilization procedure: I understand that if the procedure is successful the results will be permanent and it will therefore be impossible for me to inseminate, conceive, or bear children.  I also understand that the procedure is intended to result in sterility, although the result has not been guaranteed.   11. I acknowledge that my physician has explained sedation/analgesia administration to me including the risk and benefits I consent to the administration of sedation/analgesia as may be necessary or desirable in the judgment of my physician.    I CERTIFY THAT I HAVE READ AND FULLY UNDERSTAND THE ABOVE CONSENT TO OPERATION and/or OTHER PROCEDURE.      ____________________________________       _________________________________      ______________________________  Signature of Patient         Signature of Responsible Person        Printed Name of Responsible Person   ____________________________________      _________________________________      ______________________________       Signature of Witness          Relationship to Patient                       Date                                       Time  Patient Name: Samantha Beasley  : 1/3/1955    Reviewed: 2024   Printed: 2024  Medical Record #: [id number] Page 1 of 1

## (undated) NOTE — LETTER
[address]  [address]  Consent for Procedure/Sedation  Date: 09/12/2024         Time: 1400    I hereby authorize Dr. Mercado, my physician and his/her assistants (if applicable), which may include medical students, residents, and/or fellows, to perform the following surgical operation/ procedure and administer such anesthesia as may be determined necessary by my physician:  Operation/Procedure name (s)  Cardiac Catheterization, Left Ventricular Cineangiography, Bilateral Selective Coronary Angiography and/or Right Heart Catheterization; possible Percutaneous Transluminal Coronary Angioplasty, Coronary Atherectomy, Coronary Stent, Intracoronary Thrombolytic therapy, Antiplatelet therapy and/or Intravascular Ultrasound on [facility]   2.   I recognize that during the surgical operation/procedure, unforeseen conditions may necessitate additional or different procedures than those listed above.  I, therefore, further authorize and request that the above-named surgeon, assistants, or designees perform such procedures as are, in their judgment, necessary and desirable.    3.   My surgeon/physician has discussed prior to my surgery the potential benefits, risks and side effects of this procedure; the likelihood of achieving goals; and potential problems that might occur during recuperation.  They also discussed reasonable alternatives to the procedure, including risks, benefits, and side effects related to the alternatives and risks related to not receiving this procedure.  I have had all my questions answered and I acknowledge that no guarantee has been made as to the result that may be obtained.    4.   Should the need arise during my operation/procedure, which includes change of level of care prior to discharge, I also consent to the administration of blood and/or blood products.  Further, I understand that despite careful testing and screening of blood or blood products by  collecting agencies, I may still be subject to ill effects as a result of receiving a blood transfusion and/or blood products.  The following are some, but not all, of the potential risks that can occur: fever and allergic reactions, hemolytic reactions, transmission of diseases such as Hepatitis, AIDS and Cytomegalovirus (CMV) and fluid overload.  In the event that I wish to have an autologous transfusion of my own blood, or a directed donor transfusion, I will discuss this with my physician.  Check only if Refusing Blood or Blood Products  I understand refusal of blood or blood products as deemed necessary by my physician may have serious consequences to my condition to include possible death. I hereby assume responsibility for my refusal and release the hospital, its personnel, and my physicians from any responsibility for the consequences of my refusal.          o  Refuse      5.   I authorize the use of any specimen, organs, tissues, body parts or foreign objects that may be removed from my body during the operation/procedure for diagnosis, research or teaching purposes and their subsequent disposal by hospital authorities.  I also authorize the release of specimen test results and/or written reports to my treating physician on the hospital medical staff or other referring or consulting physicians involved in my care, at the discretion of the Pathologist or my treating physician.    6.   I consent to the photographing or videotaping of the operations or procedures to be performed, including appropriate portions of my body for medical, scientific, or educational purposes, provided my identity is not revealed by the pictures or by descriptive texts accompanying them.  If the procedure has been photographed/videotaped, the surgeon will obtain the original picture, image, videotape or CD.  The hospital will not be responsible for storage, release or maintenance of the picture, image, tape or CD.    7.   I consent  to the presence of a  or observers in the operating room as deemed necessary by my physician or their designees.    8.   I recognize that in the event my procedure results in extended X-Ray/fluoroscopy time, I may develop a skin reaction.    9. If I have a Do Not Attempt Resuscitation (DNAR) order in place, that status will be suspended while in the operating room, procedural suite, and during the recovery period unless otherwise explicitly stated by me (or a person authorized to consent on my behalf). The surgeon or my attending physician will determine when the applicable recovery period ends for purposes of reinstating the DNAR order.  10. Patients having a sterilization procedure: I understand that if the procedure is successful the results will be permanent and it will therefore be impossible for me to inseminate, conceive, or bear children.  I also understand that the procedure is intended to result in sterility, although the result has not been guaranteed.   11. I acknowledge that my physician has explained sedation/analgesia administration to me including the risk and benefits I consent to the administration of sedation/analgesia as may be necessary or desirable in the judgment of my physician.    I CERTIFY THAT I HAVE READ AND FULLY UNDERSTAND THE ABOVE CONSENT TO OPERATION and/or OTHER PROCEDURE.      ____________________________________       _________________________________      ______________________________  Signature of Patient         Signature of Responsible Person        Printed Name of Responsible Person   ____________________________________      _________________________________      ______________________________       Signature of Witness          Relationship to Patient                       Date                                       Time  Patient Name: Samantha Beasley  : 1/3/1955    Reviewed: 2024   Printed: 2024  Medical Record #: [id number] Page 1  of 1

## (undated) NOTE — LETTER
09/17/24           The Labs are Fasting so:  No Food for 10-12 hrs, Drink Plenty of Water, Take Medications at least 1 hour prior to blood draw.       Refrain from taking any Biotin or Biotin containing supplements at least 3-4 days prior to blood draw.     We take each of our patient's health very seriously and the key to maintaining your health is to routinely follow-up as planned with providers and lab work.     Please call our office directly if you have any questions at [phone].    Thank You,      [facility]